# Patient Record
Sex: FEMALE | Race: WHITE | HISPANIC OR LATINO | Employment: UNEMPLOYED | URBAN - METROPOLITAN AREA
[De-identification: names, ages, dates, MRNs, and addresses within clinical notes are randomized per-mention and may not be internally consistent; named-entity substitution may affect disease eponyms.]

---

## 2017-06-20 ENCOUNTER — HOSPITAL ENCOUNTER (EMERGENCY)
Facility: MEDICAL CENTER | Age: 37
End: 2017-06-23
Attending: EMERGENCY MEDICINE
Payer: COMMERCIAL

## 2017-06-20 DIAGNOSIS — F29 PSYCHOSIS, UNSPECIFIED PSYCHOSIS TYPE (HCC): ICD-10-CM

## 2017-06-20 LAB
ALBUMIN SERPL BCP-MCNC: 4.5 G/DL (ref 3.2–4.9)
ALBUMIN/GLOB SERPL: 1.5 G/DL
ALP SERPL-CCNC: 44 U/L (ref 30–99)
ALT SERPL-CCNC: 22 U/L (ref 2–50)
AMPHET UR QL SCN: NEGATIVE
ANION GAP SERPL CALC-SCNC: 8 MMOL/L (ref 0–11.9)
AST SERPL-CCNC: 28 U/L (ref 12–45)
BARBITURATES UR QL SCN: NEGATIVE
BASOPHILS # BLD AUTO: 0.6 % (ref 0–1.8)
BASOPHILS # BLD: 0.04 K/UL (ref 0–0.12)
BENZODIAZ UR QL SCN: NEGATIVE
BILIRUB SERPL-MCNC: 0.5 MG/DL (ref 0.1–1.5)
BUN SERPL-MCNC: 7 MG/DL (ref 8–22)
BZE UR QL SCN: NEGATIVE
CALCIUM SERPL-MCNC: 9.3 MG/DL (ref 8.5–10.5)
CANNABINOIDS UR QL SCN: NEGATIVE
CHLORIDE SERPL-SCNC: 108 MMOL/L (ref 96–112)
CO2 SERPL-SCNC: 24 MMOL/L (ref 20–33)
CREAT SERPL-MCNC: 0.72 MG/DL (ref 0.5–1.4)
EOSINOPHIL # BLD AUTO: 0.04 K/UL (ref 0–0.51)
EOSINOPHIL NFR BLD: 0.6 % (ref 0–6.9)
ERYTHROCYTE [DISTWIDTH] IN BLOOD BY AUTOMATED COUNT: 44.1 FL (ref 35.9–50)
GFR SERPL CREATININE-BSD FRML MDRD: >60 ML/MIN/1.73 M 2
GLOBULIN SER CALC-MCNC: 3.1 G/DL (ref 1.9–3.5)
GLUCOSE SERPL-MCNC: 122 MG/DL (ref 65–99)
HCG SERPL QL: NEGATIVE
HCG UR QL: NEGATIVE
HCT VFR BLD AUTO: 37.7 % (ref 37–47)
HGB BLD-MCNC: 12.4 G/DL (ref 12–16)
IMM GRANULOCYTES # BLD AUTO: 0.03 K/UL (ref 0–0.11)
IMM GRANULOCYTES NFR BLD AUTO: 0.5 % (ref 0–0.9)
LYMPHOCYTES # BLD AUTO: 2.4 K/UL (ref 1–4.8)
LYMPHOCYTES NFR BLD: 38.4 % (ref 22–41)
MCH RBC QN AUTO: 29 PG (ref 27–33)
MCHC RBC AUTO-ENTMCNC: 32.9 G/DL (ref 33.6–35)
MCV RBC AUTO: 88.3 FL (ref 81.4–97.8)
MDMA UR QL SCN: NEGATIVE
METHADONE UR QL SCN: NEGATIVE
MONOCYTES # BLD AUTO: 0.48 K/UL (ref 0–0.85)
MONOCYTES NFR BLD AUTO: 7.7 % (ref 0–13.4)
NEUTROPHILS # BLD AUTO: 3.26 K/UL (ref 2–7.15)
NEUTROPHILS NFR BLD: 52.2 % (ref 44–72)
NRBC # BLD AUTO: 0 K/UL
NRBC BLD AUTO-RTO: 0 /100 WBC
OPIATES UR QL SCN: NEGATIVE
OXYCODONE UR QL SCN: NEGATIVE
PCP UR QL SCN: NEGATIVE
PLATELET # BLD AUTO: 246 K/UL (ref 164–446)
PMV BLD AUTO: 11.4 FL (ref 9–12.9)
POC BREATHALIZER: 0.01 PERCENT (ref 0–0.01)
POTASSIUM SERPL-SCNC: 3.4 MMOL/L (ref 3.6–5.5)
PROPOXYPH UR QL SCN: NEGATIVE
PROT SERPL-MCNC: 7.6 G/DL (ref 6–8.2)
RBC # BLD AUTO: 4.27 M/UL (ref 4.2–5.4)
SODIUM SERPL-SCNC: 140 MMOL/L (ref 135–145)
SP GR UR REFRACTOMETRY: 1
WBC # BLD AUTO: 6.3 K/UL (ref 4.8–10.8)

## 2017-06-20 PROCEDURE — 96376 TX/PRO/DX INJ SAME DRUG ADON: CPT

## 2017-06-20 PROCEDURE — 96374 THER/PROPH/DIAG INJ IV PUSH: CPT

## 2017-06-20 PROCEDURE — 90791 PSYCH DIAGNOSTIC EVALUATION: CPT

## 2017-06-20 PROCEDURE — 81025 URINE PREGNANCY TEST: CPT

## 2017-06-20 PROCEDURE — 85025 COMPLETE CBC W/AUTO DIFF WBC: CPT

## 2017-06-20 PROCEDURE — 84703 CHORIONIC GONADOTROPIN ASSAY: CPT

## 2017-06-20 PROCEDURE — 80307 DRUG TEST PRSMV CHEM ANLYZR: CPT

## 2017-06-20 PROCEDURE — 700111 HCHG RX REV CODE 636 W/ 250 OVERRIDE (IP): Performed by: EMERGENCY MEDICINE

## 2017-06-20 PROCEDURE — 99285 EMERGENCY DEPT VISIT HI MDM: CPT

## 2017-06-20 PROCEDURE — 80053 COMPREHEN METABOLIC PANEL: CPT

## 2017-06-20 PROCEDURE — 302970 POC BREATHALIZER: Performed by: EMERGENCY MEDICINE

## 2017-06-20 PROCEDURE — 302970 POC BREATHALIZER

## 2017-06-20 RX ORDER — LORAZEPAM 2 MG/ML
1-2 INJECTION INTRAMUSCULAR
Status: DISCONTINUED | OUTPATIENT
Start: 2017-06-20 | End: 2017-06-21 | Stop reason: SDDI

## 2017-06-20 RX ADMIN — LORAZEPAM 1 MG: 2 INJECTION INTRAMUSCULAR; INTRAVENOUS at 22:13

## 2017-06-20 RX ADMIN — LORAZEPAM 2 MG: 2 INJECTION INTRAMUSCULAR; INTRAVENOUS at 20:28

## 2017-06-20 RX ADMIN — LORAZEPAM 1 MG: 2 INJECTION INTRAMUSCULAR; INTRAVENOUS at 16:31

## 2017-06-20 ASSESSMENT — PAIN SCALES - GENERAL: PAINLEVEL_OUTOF10: 0

## 2017-06-20 ASSESSMENT — LIFESTYLE VARIABLES: DO YOU DRINK ALCOHOL: NO
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
## 2017-06-20 NOTE — ED AVS SNAPSHOT
[email or web address] Access Code: [email or web address]  Expires: 7/23/2017 12:27 PM    Your email address is not on file at [facility].  Email Addresses are required for you to sign up for [email or web address], please contact [phone] to verify your personal information and to provide your email address prior to attempting to register for Refinery29.    Mohini Aguirre SepulvedaGeorge  [address] [place] [address], [place] [address]    Refinery29  A secure, online tool to manage your health information     [facility]’s Refinery29® is a secure, online tool that connects you to your personalized health information from the privacy of your home -- day or night - making it very easy for you to manage your healthcare. Once the activation process is completed, you can even access your medical information using the Refinery29 raciel, which is available for free in the Apple Raciel store or Google Play store.     To learn more about Refinery29, visit [email or web address]    There are two levels of access available (as shown below):   My Chart Features  [facility] Primary Care Doctor [facility]  Specialists [facility]  Urgent  Care Non-[facility] Primary Care Doctor   Email your healthcare team securely and privately 24/7 X X X    Manage appointments: schedule your next appointment; view details of past/upcoming appointments X      Request prescription refills. X      View recent personal medical records, including lab and immunizations X X X X   View health record, including health history, allergies, medications X X X X   Read reports about your outpatient visits, procedures, consult and ER notes X X X X   See your discharge summary, which is a recap of your hospital and/or ER visit that includes your diagnosis, lab results, and care plan X X  X     How to register for EidoSearcht:  Once your e-mail address has been verified, follow the following steps to sign up for EidoSearcht.     1. Go to  [email or web address]  2. Click on the Sign Up Now box, which takes you to the New Member Sign Up  page. You will need to provide the following information:  a. Enter your Cohuman Access Code exactly as it appears at the top of this page. (You will not need to use this code after you’ve completed the sign-up process. If you do not sign up before the expiration date, you must request a new code.)   b. Enter your date of birth.   c. Enter your home email address.   d. Click Submit, and follow the next screen’s instructions.  3. Create a Cohuman ID. This will be your Cohuman login ID and cannot be changed, so think of one that is secure and easy to remember.  4. Create a Cohuman password. You can change your password at any time.  5. Enter your Password Reset Question and Answer. This can be used at a later time if you forget your password.   6. Enter your e-mail address. This allows you to receive e-mail notifications when new information is available in Cohuman.  7. Click Sign Up. You can now view your health information.    For assistance activating your Cohuman account, call [phone]

## 2017-06-20 NOTE — ED AVS SNAPSHOT
Home Care Instructions                                                                                                                Mohini Mason   MRN: [id number]    Department:  [facility], Emergency Dept   Date of Visit:  6/20/2017            [facility], Emergency Dept    [address] [address]    Phone:  [phone]      You were seen by     1. Jose L Michele M.D.    2. Karl Benoit M.D.    3. Romario Grant D.O.    4. Renny Henley M.D.    5. Bharat Chávez M.D.    6. Severiano Brown M.D.    7. Wan Menon M.D.      Your Diagnosis Was     Psychosis, unspecified psychosis type     F29       These are the medications you received during your hospitalization from 06/20/2017 1425 to 06/23/2017 1227     Date/Time Order Dose Route Action    06/20/2017 2213 lorazepam (ATIVAN) injection 1-2 mg 1 mg Intravenous Given    06/20/2017 2028 lorazepam (ATIVAN) injection 1-2 mg 2 mg Intravenous Given    06/20/2017 1631 lorazepam (ATIVAN) injection 1-2 mg 1 mg Intravenous Given    06/21/2017 1030 ondansetron (ZOFRAN ODT) dispertab 4 mg 4 mg Oral Given    06/21/2017 1215 lorazepam (ATIVAN) tablet 1 mg 1 mg Oral Given    06/21/2017 1400 haloperidol lactate (HALDOL) injection 5 mg 5 mg Intramuscular Given    06/23/2017 0742 quetiapine (SEROQUEL) tablet 50 mg 50 mg Oral Given    06/22/2017 1430 quetiapine (SEROQUEL) tablet 50 mg 50 mg Oral Given    06/22/2017 0821 quetiapine (SEROQUEL) tablet 50 mg 50 mg Oral Given    06/21/2017 2119 quetiapine (SEROQUEL) tablet 100 mg 100 mg Oral Given    06/22/2017 1155 lorazepam (ATIVAN) tablet 2 mg 2 mg Oral Given    06/22/2017 2128 quetiapine (SEROQUEL) tablet 200 mg 200 mg Oral Given    06/22/2017 2128 lorazepam (ATIVAN) tablet 1 mg 1 mg Oral Given    06/23/2017 0740 lorazepam (ATIVAN) tablet 1 mg 1 mg Oral Given      Medication Information     Review all of your home medications and newly  ordered medications with your primary doctor and/or pharmacist as soon as possible. Follow medication instructions as directed by your doctor and/or pharmacist.     Please keep your complete medication list with you and share with your physician. Update the information when medications are discontinued, doses are changed, or new medications (including over-the-counter products) are added; and carry medication information at all times in the event of emergency situations.               Medication List      Notice     You have not been prescribed any medications.            Procedures and tests performed during your visit     Procedure/Test Number of Times Performed    BETA-HCG QUALITATIVE URINE 1    CBC WITH DIFFERENTIAL 1    COMP METABOLIC PANEL 1    ED CONSULT TO BEHAVIORAL HEALTH 1    ESTIMATED GFR 1    HCG QUAL SERUM 1    NURSING COMMUNICATION 2    POC BREATHALIZER 2    REFRACTOMETER SG 1    TSH WITH REFLEX TO FT4 1    URINE DRUG SCREEN 1        Discharge Instructions       Alucinaciones e ideas delirantes  (Hallucinations and Delusions)  Usted ha sufrido alucinaciones o ideas delirantes. Ghulam vez haya escuchado voces que nadie más puede oir. Y que parecen ser muy reales para usted. Puede benson tenido pensamientos y temores que no tienen sentido para los demás. Deepthi problema puede deberse a sari enfermedad mental denominada esquizofrenia. Puede ser causada por un problema médico, ki sari infección o por un trastorno electrolítico. Estos síntomas también se observan en las personas que consumen drogas, especialmente los que utilizan crack, cocaína y anfetaminas. Drogas ki el PCP, LSD, MDMA, peyote y psilocibin tambien pueden causar alucinaciones atemorizantes y pérdida del control.  Si shannon síntomas se deben a reina adicción a las drogas, reina estado mental mejorará cuando no tenga rastros de drogas en reina organismo. Alguna persona de reina confianza deberá acompañarlo para protegerlo y calmar shannon temores hasta que se  sienta mejor. Con frecuencia los tranquilizantes son de gran ayuda para controlar las alucinaciones, la ansiedad y las conductas destructivas. Para la recuperación también es importante que siga sari dieta adecuada y duerma lo suficiente. Si elina síntomas no se deben al consumo de drogas, o no mejoran luego de algunos días después de abandonar el consumo, necesitará un control más profundo de reina estado general o reina bee mental.  SOLICITE ATENCIÓN MÉDICA DE INMEDIATO SI:  · Elina síntomas empeoran, especialmente si considera que reina bryan está en peligro.  · Tiene pensamientos violentos o destructivos.  La recuperación es posible, scar debe seguir el tratamiento adecuado y evitar las drogas que usted sabe que le causaron el problema.  Document Released: 12/18/2006 Document Revised: 03/11/2013  ExitCare® Patient Information ©2014 [employer].            Patient Information     Patient Information    Following emergency treatment: all patient requiring follow-up care must return either to a private physician or a clinic if your condition worsens before you are able to obtain further medical attention, please return to the emergency room.     Billing Information    At [facility], we work to make the billing process streamlined for our patients.  Our Representatives are here to answer any questions you may have regarding your hospital bill.  If you have insurance coverage and have supplied your insurance information to us, we will submit a claim to your insurer on your behalf.  Should you have any questions regarding your bill, we can be reached online or by phone as follows:  Online: You are able pay your bills online or live chat with our representatives about any billing questions you may have. We are here to help Monday - Friday from 8:00am to 7:30pm and 9:00am - 12:00pm on Saturdays.  Please visit [email or web address]  for more information.   Phone:  [phone] or  [phone]    Please note that your emergency physician, surgeon, pathologist, radiologist, anesthesiologist, and other specialists are not employed by [facility] and will therefore bill separately for their services.  Please contact them directly for any questions concerning their bills at the numbers below:     Emergency Physician Services:  [phone]  [facility]:  [phone]  [facility]:  [phone]  [facility]:  [phone]    1. Your final bill may vary from the amount quoted upon discharge if all procedures are not complete at that time, or if your doctor has additional procedures of which we are not aware. You will receive an additional bill if you return to the Emergency Department at [facility] for suture removal regardless of the facility of which the sutures were placed.     2. Please arrange for settlement of this account at the emergency registration.    3. All self-pay accounts are due in full at the time of treatment.  If you are unable to meet this obligation then payment is expected within 4-5 days.     4. If you have had radiology studies (CT, X-ray, Ultrasound, MRI), you have received a preliminary result during your emergency department visit. Please contact the radiology department [phone] to receive a copy of your final result. Please discuss the Final result with your primary physician or with the follow up physician provided.     Crisis Hotline:  [place] Crisis Hotline:  1-208-UZVJWDU or 1-218.399.1917  [place] Crisis Hotline:    1-651.216.8537 or 898-621-3228         ED Discharge Follow Up Questions    1. In order to provide you with very good care, we would like to follow up with a phone call in the next few days.  May we have your permission to contact you?     YES /  NO    2. What is the best phone number to call you? (       )_____-__________    3. What is the best time to call you?      Morning  /  Afternoon  /   Evening                   Patient Signature:  ____________________________________________________________    Date:  ____________________________________________________________
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
## 2017-06-20 NOTE — ED NOTES
Pt is a poor historian and will answer some questions directly but gets off track easily and is distractable.

## 2017-06-20 NOTE — ED AVS SNAPSHOT
6/23/2017    Mohini Aguirre Marlon  [address] [facility] [place] [address]    Dear Mohini:    [facility] wants to ensure your discharge home is safe and you or your loved ones have had all of your questions answered regarding your care after you leave the hospital.    Below is a list of resources and contact information should you have any questions regarding your hospital stay, follow-up instructions, or active medical symptoms.    Questions or Concerns Regarding… Contact   Medical Questions Related to Your Discharge  (7 days a week, 8am-5pm) Contact a Nurse Care Coordinator   [phone]   Medical Questions Not Related to Your Discharge  (24 hours a day / 7 days a week)  Contact the Nurse Health Line   [phone]    Medications or Discharge Instructions Refer to your discharge packet   or contact your [facility] Primary Care Provider   [phone]   Follow-up Appointment(s) Schedule your appointment via Prepmatic   or contact Scheduling [phone]   Billing Review your statement via Prepmatic  or contact Billing [phone]   Medical Records Review your records via Prepmatic   or contact Medical Records [phone]     You may receive a telephone call within two days of discharge. This call is to make certain you understand your discharge instructions and have the opportunity to have any questions answered. You can also easily access your medical information, test results and upcoming appointments via the Prepmatic free online health management tool. You can learn more and sign up at "[employer]"/Prepmatic. For assistance setting up your Prepmatic account, please call [phone].    Once again, we want to ensure your discharge home is safe and that you have a clear understanding of any next steps in your care. If you have any questions or concerns, please do not hesitate to contact us, we are here for you. Thank you for choosing [facility] for your healthcare needs.    Sincerely,    Your [facility] Healthcare Team

## 2017-06-21 PROCEDURE — A9270 NON-COVERED ITEM OR SERVICE: HCPCS | Performed by: PSYCHIATRY & NEUROLOGY

## 2017-06-21 PROCEDURE — 700111 HCHG RX REV CODE 636 W/ 250 OVERRIDE (IP)

## 2017-06-21 PROCEDURE — 700102 HCHG RX REV CODE 250 W/ 637 OVERRIDE(OP)

## 2017-06-21 PROCEDURE — A9270 NON-COVERED ITEM OR SERVICE: HCPCS

## 2017-06-21 PROCEDURE — 700102 HCHG RX REV CODE 250 W/ 637 OVERRIDE(OP): Performed by: PSYCHIATRY & NEUROLOGY

## 2017-06-21 PROCEDURE — 96372 THER/PROPH/DIAG INJ SC/IM: CPT

## 2017-06-21 PROCEDURE — 700111 HCHG RX REV CODE 636 W/ 250 OVERRIDE (IP): Performed by: EMERGENCY MEDICINE

## 2017-06-21 RX ORDER — QUETIAPINE FUMARATE 100 MG/1
100 TABLET, FILM COATED ORAL EVERY EVENING
Status: DISCONTINUED | OUTPATIENT
Start: 2017-06-21 | End: 2017-06-22

## 2017-06-21 RX ORDER — HALOPERIDOL 5 MG/ML
5 INJECTION INTRAMUSCULAR ONCE
Status: COMPLETED | OUTPATIENT
Start: 2017-06-21 | End: 2017-06-21

## 2017-06-21 RX ORDER — LORAZEPAM 1 MG/1
1 TABLET ORAL ONCE
Status: COMPLETED | OUTPATIENT
Start: 2017-06-21 | End: 2017-06-21

## 2017-06-21 RX ORDER — ONDANSETRON 4 MG/1
4 TABLET, ORALLY DISINTEGRATING ORAL ONCE
Status: COMPLETED | OUTPATIENT
Start: 2017-06-21 | End: 2017-06-21

## 2017-06-21 RX ORDER — QUETIAPINE FUMARATE 25 MG/1
50 TABLET, FILM COATED ORAL 2 TIMES DAILY
Status: DISCONTINUED | OUTPATIENT
Start: 2017-06-21 | End: 2017-06-23 | Stop reason: HOSPADM

## 2017-06-21 RX ADMIN — LORAZEPAM 1 MG: 1 TABLET ORAL at 12:15

## 2017-06-21 RX ADMIN — HALOPERIDOL LACTATE 5 MG: 5 INJECTION, SOLUTION INTRAMUSCULAR at 14:00

## 2017-06-21 RX ADMIN — ONDANSETRON 4 MG: 4 TABLET, ORALLY DISINTEGRATING ORAL at 10:30

## 2017-06-21 RX ADMIN — QUETIAPINE FUMARATE 100 MG: 100 TABLET ORAL at 21:19

## 2017-06-21 ASSESSMENT — PAIN SCALES - GENERAL: PAINLEVEL_OUTOF10: 0

## 2017-06-21 NOTE — ED NOTES
Pt resting comfortably on gurney. Pt with no changes from previous assessments. Respirations are even and unlabored. Bed in lowest position, q15min monitoring. Pt in direct view. Pt with no further needs at this time.

## 2017-06-21 NOTE — ED PROVIDER NOTES
"ED Provider Note    CHIEF COMPLAINT  Chief Complaint   Patient presents with   • Legal 2000     Pt arrives via EMS on a legal hold for psychosis and inability to care for self. Per report of pt's son, she has been not sleeping or eating, has been drinking an excessive amount of water, is hyperactive and has discorganized thinking. Pt talking about her babies.        HPI  Mohini Mason is a 37 y.o. female with a history of depression, PTSD who presents by EMS for psychiatric evaluation. Per the patient's son, the patient has not been eating or sleeping, but has been drinking excessive amounts of water. She appeared to be hyperactive with abnormal behavior. The patient was transported to the ER for further evaluation. The patient denies any history of drug or alcohol use. She is acting inappropriately, trying to take her clothes off, stating she needs to go to the \"[place]\".  The patient denies any falls or injury. She denies any headache, neck pain, chest pain, back pain, or abdominal pain.    REVIEW OF SYSTEMS  See HPI for further details. All other systems are negative.     PAST MEDICAL HISTORY  Past Medical History   Diagnosis Date   • Psychiatric disorder      depression       FAMILY HISTORY  No family history on file.    SOCIAL HISTORY  Social History     Social History   • Marital Status: Single     Spouse Name: N/A   • Number of Children: N/A   • Years of Education: N/A     Social History Main Topics   • Smoking status: Never Smoker    • Smokeless tobacco: None   • Alcohol Use: No   • Drug Use: No   • Sexual Activity: Not Asked     Other Topics Concern   • None     Social History Narrative       SURGICAL HISTORY  History reviewed. No pertinent past surgical history.    CURRENT MEDICATIONS  Home Medications     Reviewed by Demario Basurto (Pharmacy Tech) on 06/20/17 at 2015  Med List Status: Complete    Medication Last Dose Status          Patient Vladimir Taking any Medications                  " "      ALLERGIES  No Known Allergies    PHYSICAL EXAM  VITAL SIGNS: /87 mmHg  Pulse 83  Temp(Src) 36.1 °C (96.9 °F)  Resp 16  Ht 1.549 m (5' 1\")  Wt 52.164 kg (115 lb)  BMI 21.74 kg/m2  Constitutional: Awake, alert, in no acute distress, Non-toxic appearance.   HENT: Atraumatic. Bilateral external ears normal, mucous membranes moist, throat nonerythematous without exudates, nose is normal.  Eyes: PERRL, EOMI, conjunctiva moist, noninjected.  Neck: Nontender, Normal range of motion, No nuchal rigidity, No stridor.   Lymphatic: No lymphadenopathy noted.   Cardiovascular: Regular rate and rhythm, no murmurs, rubs, gallops.  Thorax & Lungs:  Good breath sounds bilaterally, no wheezes, rales, or retractions.  No chest tenderness.  Abdomen: Bowel sounds normal, Soft, nontender, nondistended, no rebound, guarding, masses.  Back: No CVA or spinal tenderness.  Extremities: Intact distal pulses, No edema, No tenderness.   Skin: Warm, Dry, No rashes.   Musculoskeletal: No joint swelling or tenderness.  Neurologic: Alert & oriented x 2, cannot give me the year or date, and radial nerves II through XII appear intact, no facial asymmetry, sensory intact to light touch, and motor function shows 5/5 strength to the upper and lower extremities though patient cannot comply with a full neurologic exam..    Psychiatric: The patient appears mildly psychotic, unable to care for herself. She appears to have pressured speech, appears disorganized, unable to carry on a coherent conversation.        RADIOLOGY/PROCEDURES  No orders to display         COURSE & MEDICAL DECISION MAKING  Pertinent Labs & Imaging studies reviewed. (See chart for details)  The patient presents for a psychiatric evaluation. She appears to be mildly psychotic. Initially was somewhat agitated and combative on arrival, and was placed in 4 point restraints. IV is placed, she was given Ativan 1 mg IV.  CBC white count of 6300, normal differential, hemoglobin " 12.4, chemistry potassium 3.4, glucose 122, otherwise normal, urine drug screen negative, breath alcohol of 0.01, hCG negative.  A [facility] consult was obtained, and patient was felt unable to care for herself. Patient was placed on a legal 2000, and will be transferred to mental health and accepted.    FINAL IMPRESSION  1. Acute psychosis  2.   3.         Electronically signed by: Jose L Michele, 6/20/2017 8:38 PM

## 2017-06-21 NOTE — ED NOTES
Pt resting comfortably on gurney. Pt with no changes from previous assessments. Respirations are even and unlabored. Bed in lowest position, q15 monitoring in place. Pt in direct view. Pt with no further needs at this time.

## 2017-06-21 NOTE — ED NOTES
Pt resting comfortably on gurney. Pt with no changes from previous assessments. Respirations are even and unlabored. Bed in lowest position, sitter at bedside. Pt in direct view. Pt with no further needs at this time.

## 2017-06-21 NOTE — PROGRESS NOTES
Patient's home medications have been reviewed by the pharmacy team.     Patient's Medications   New Prescriptions    No medications on file   Previous Medications    No medications on file   Modified Medications    No medications on file   Discontinued Medications    ALPRAZOLAM (XANAX) 0.5 MG TAB    Take 1 Tab by mouth 2 times a day as needed for Sleep or Anxiety.         A:  Medications do not appear to be contributing to current complaints. Denies taking medications.      P:    No recommendations at this time. Await psych recommendations.      Dawood Meraz, PharmD, BCPS

## 2017-06-21 NOTE — ED NOTES
Pt resting comfortably on gurney. Pt with no changes from previous assessments. Respirations are even and unlabored. Bed in lowest position, q15 in place. Pt in direct view. Pt with no further needs at this time.

## 2017-06-21 NOTE — ED NOTES
Pt refused to let me give her a shot. Called security for backup. Pt peed on the floor and started eating paper and singing.  Pt used crayons to color all over the walls and floors.  Security assisted to give IM haldol. Pt begin to sing about her son in Northern [place].

## 2017-06-21 NOTE — ED NOTES
Pt resting comfortably on gurney. Pt with no changes from previous assessments. Respirations are even and unlabored. Bed in lowest position. Pt in direct view. Pt with no further needs at this time.

## 2017-06-21 NOTE — ED NOTES
Pt cutting hands with top of urine cup. Removed all things from room including crayons and trash can. Pt spit out what she was eating of her lunch and threw the rest in the trash as i was pulling the trash can out of the room.

## 2017-06-21 NOTE — ED NOTES
Respirations are even and unlabored. Bed in lowest position, q15 monitoring in place. Pt in direct view. Pt with no further needs at this time.

## 2017-06-21 NOTE — ED NOTES
Security at bedside to remove hard restraints. Pt resting comfortably and calmly in bed. Pt demonstrates understanding of appropriate behavior at this time. Pt agrees to sleep at this time.

## 2017-06-21 NOTE — DISCHARGE PLANNING
Medical Social Work    Referral: Legal Hold    Intervention: Legal Hold Paperwork given to SW by [employer] RN: Imani    Legal Hold Initiated: Date: 06/20/2017  Time: 1340    Legal Hold faxed: Date: 06/20/2017  Time: 2140    Patient’s Insurance Listed on Face Sheet: None    Referrals sent to: [facility]    Plan: Patient will transfer to mental health facility once acceptance is obtained.

## 2017-06-21 NOTE — ED NOTES
Med rec updated and complete.  Allergies reviewed.  Pt was willing to  Talk to this writer.  Pt denied that she takes medications.  Pt stated that she thinks that she should take medications.

## 2017-06-21 NOTE — ED NOTES
"Pt acting with bizarre behavior outside of room. Pt standing in hallway refusing to go back to bed. Pt fidgeting. Pt rambling, \"Bathroom, bathroom, bathroom.\" Pt pointing towards door. Pt will not cooperate with RN. Offered assistance returning to room but pt refusing. Pt throwing arms in air and swinging toward RN. Pt appears to have removed IV from arm. Security called.  Pt escorted back to room. New PIV established with security at bedside. Pt medicated per MAR with 2mg Ativan. Behavioral restraints applied. ERP notified.   "

## 2017-06-21 NOTE — ED NOTES
Pt ran to bathroom to vomit. Pt found spitting in the toilet. No vomit present in toilet or trash can. Gave ODT zofran

## 2017-06-21 NOTE — ED NOTES
"Pt's son on phone requesting updates and information about pt. Pt unable to given consent to speak with son at this time. Pt confusing with bizarre thinking at the time. Pt not answering my quesionts only repeating, \"She, she, she.\" Pt re-oriented and redirected. Assurance provided. Advised son that he can try to call back to tomorrow. Son voiced understanding.   "

## 2017-06-21 NOTE — ED NOTES
Pt remains calm and drowsy. Has not been removing her clothing or making moves to harm self. Security at bedside for complete  restraint removal.

## 2017-06-22 LAB
POC BREATHALIZER: 0 PERCENT (ref 0–0.01)
TSH SERPL DL<=0.005 MIU/L-ACNC: 0.87 UIU/ML (ref 0.3–3.7)

## 2017-06-22 PROCEDURE — A9270 NON-COVERED ITEM OR SERVICE: HCPCS | Performed by: PSYCHIATRY & NEUROLOGY

## 2017-06-22 PROCEDURE — 700102 HCHG RX REV CODE 250 W/ 637 OVERRIDE(OP): Performed by: PSYCHIATRY & NEUROLOGY

## 2017-06-22 PROCEDURE — 84443 ASSAY THYROID STIM HORMONE: CPT

## 2017-06-22 PROCEDURE — A9270 NON-COVERED ITEM OR SERVICE: HCPCS | Performed by: EMERGENCY MEDICINE

## 2017-06-22 PROCEDURE — 700102 HCHG RX REV CODE 250 W/ 637 OVERRIDE(OP): Performed by: EMERGENCY MEDICINE

## 2017-06-22 RX ORDER — LORAZEPAM 1 MG/1
1 TABLET ORAL
Status: COMPLETED | OUTPATIENT
Start: 2017-06-22 | End: 2017-06-23

## 2017-06-22 RX ORDER — LORAZEPAM 1 MG/1
1 TABLET ORAL NIGHTLY
Status: DISCONTINUED | OUTPATIENT
Start: 2017-06-22 | End: 2017-06-22

## 2017-06-22 RX ORDER — LORAZEPAM 1 MG/1
2 TABLET ORAL ONCE
Status: COMPLETED | OUTPATIENT
Start: 2017-06-22 | End: 2017-06-22

## 2017-06-22 RX ORDER — LORAZEPAM 1 MG/1
1 TABLET ORAL
Status: DISCONTINUED | OUTPATIENT
Start: 2017-06-22 | End: 2017-06-23 | Stop reason: HOSPADM

## 2017-06-22 RX ORDER — QUETIAPINE FUMARATE 100 MG/1
200 TABLET, FILM COATED ORAL EVERY EVENING
Status: DISCONTINUED | OUTPATIENT
Start: 2017-06-22 | End: 2017-06-23 | Stop reason: HOSPADM

## 2017-06-22 RX ADMIN — LORAZEPAM 2 MG: 1 TABLET ORAL at 11:55

## 2017-06-22 RX ADMIN — QUETIAPINE 50 MG: 25 TABLET, FILM COATED ORAL at 14:30

## 2017-06-22 RX ADMIN — QUETIAPINE FUMARATE 200 MG: 100 TABLET ORAL at 21:28

## 2017-06-22 RX ADMIN — QUETIAPINE 50 MG: 25 TABLET, FILM COATED ORAL at 08:21

## 2017-06-22 RX ADMIN — LORAZEPAM 1 MG: 1 TABLET ORAL at 21:28

## 2017-06-22 ASSESSMENT — PAIN SCALES - GENERAL
PAINLEVEL_OUTOF10: 0

## 2017-06-22 NOTE — PSYCHIATRY
PSYCHIATRIC CONSULTATION:seen. Full note to follow. Legal hold extended. meds started. Appears to be in a manic state.

## 2017-06-22 NOTE — ED NOTES
Pt easily awakens for VS check. No distress noted at this time.   Sitter outside room, unobstructed view of pt.

## 2017-06-22 NOTE — ED NOTES
Given crayons and coloring book.  Explained that drawing on the floor/walls is unacceptable and pt verbalizes understanding.  Sitter outside room.

## 2017-06-22 NOTE — ED NOTES
Bedside report received, Assumed care. Pt on gurney resting with eyes closed;  Rise and fall of chest seen.  Sitter outside room.

## 2017-06-22 NOTE — PSYCHIATRY
"PSYCHIATRIC FOLLOW UP:    Reason for Admission: Pt arrives via EMS on a legal hold for psychosis and inability to care for self. Per report of pt's son, she has been not sleeping or eating, has been drinking an excessive amount of water, is hyperactive and has discorganized thinking. Pt talking about her babies. Per the patient's son, the patient has not been eating or sleeping, but has been drinking excessive amounts of water. She appeared to be hyperactive with abnormal behavior, acting inappropriately, trying to take her clothes off.  Legal hold status:  +    She is writing her families names all over the floor and the walls, she is still \"cloudy\" and has VH or \"visions\" which she doesn't/can't describe. She only slept a \"little bit\". She is maybe \"a little better\" but can't describe how. She has an odd way of interacting tending to repeat statements like \"Mynor can save me\". Speaks a lot of family. However it is superficial talk for the most part. Has also drawn a \"face and 2 eyes\" on the light switch. Says she is afraid to sleep with lights off but doesn't know why. Wants a shower.    Psychiatric Examination: observed phenomenon:  Vitals:Blood pressure 121/78, pulse 78, temperature 36.6 °C (97.9 °F), resp. rate 18, height 1.549 m (5' 1\"), weight 52.164 kg (115 lb), SpO2 97 %.  Musculoskeletal(abnormal movements, gait, etc):none noted  Appearance: odorous but good eye contact. Tends to stand very close to the degree of being mildly intrusive into one's space.  Thoughts: linear and perseverant of subject matter regarding family. Psychotic.  Speech:mildly pressured.  Mood: anixous    Affect: appropriate  SI/HI: says no to SI because of her babies. Later says she is suicidal and wants to be dead. Not HI.  Attention/Alertness: mildly distracted.     Memory: intact  Orientation: x 4  Fund of Knowledge: not assessed.     Insight/Judgement into symptoms: fair to good in that she knows her head is not right   "   Assessment:(acuity level)  Bipolar Disorder I, mixed state with psychosis. Has elements of nelly: not sleeping, pacing, racing thoughts and is depressed.          Plan:may shower and have visitors and phone. Gave her my pen. She can have it to draw more on the wall with. The damage to the wall is already done with the crayons..increase seroquel for sleep with backup of ativan if more is needed.  legal hold:extended  Anticipated F/U: within 24 hours.  Labs/tests ordered:tsh     Will follow.

## 2017-06-22 NOTE — ED NOTES
Pt lying right side, eyes closed. Pt awakens to light touch.   Sitter outside of room, unobstructed view of pt.

## 2017-06-22 NOTE — ED NOTES
Pt lying supine, eyes closed. Pt awakens to light touch.   Sitter outside of room, unobstructed view of pt.

## 2017-06-22 NOTE — ED NOTES
Pt lying supine, eyes closed. Breathing equal and unlabored. No distress noted.  Sitter outside room, unobstructed view of pt.

## 2017-06-22 NOTE — ED NOTES
Pt medicated per MAR. VSS. Pt calm and cooperative.   Sitter outside of room, unobstructed view of pt.

## 2017-06-22 NOTE — ED NOTES
Pt agitated, attempting to eat paper toewls, pacing and has made several trips to the bathroom for more paper towels.  ERP aware.  Orders received.

## 2017-06-22 NOTE — ED NOTES
Pt status remains unchanged. Pt offered box meal, pt declined.  Sitter outside of room, unobstructed view of pt.

## 2017-06-23 VITALS
SYSTOLIC BLOOD PRESSURE: 120 MMHG | TEMPERATURE: 98 F | WEIGHT: 115 LBS | HEART RATE: 73 BPM | OXYGEN SATURATION: 100 % | RESPIRATION RATE: 16 BRPM | DIASTOLIC BLOOD PRESSURE: 84 MMHG | HEIGHT: 61 IN | BODY MASS INDEX: 21.71 KG/M2

## 2017-06-23 PROCEDURE — A9270 NON-COVERED ITEM OR SERVICE: HCPCS | Performed by: EMERGENCY MEDICINE

## 2017-06-23 PROCEDURE — 700102 HCHG RX REV CODE 250 W/ 637 OVERRIDE(OP): Performed by: PSYCHIATRY & NEUROLOGY

## 2017-06-23 PROCEDURE — 700102 HCHG RX REV CODE 250 W/ 637 OVERRIDE(OP): Performed by: EMERGENCY MEDICINE

## 2017-06-23 PROCEDURE — A9270 NON-COVERED ITEM OR SERVICE: HCPCS | Performed by: PSYCHIATRY & NEUROLOGY

## 2017-06-23 RX ORDER — DIVALPROEX SODIUM 125 MG/1
500 CAPSULE, COATED PELLETS ORAL EVERY 12 HOURS
Status: DISCONTINUED | OUTPATIENT
Start: 2017-06-23 | End: 2017-06-23 | Stop reason: HOSPADM

## 2017-06-23 RX ADMIN — QUETIAPINE 50 MG: 25 TABLET, FILM COATED ORAL at 07:42

## 2017-06-23 RX ADMIN — LORAZEPAM 1 MG: 1 TABLET ORAL at 07:40

## 2017-06-23 NOTE — DISCHARGE PLANNING
KRISTAN received an order from Dr. Farr to extend pts legal hold. KRISTAN faxed extension information to Tri Arguello's office and received a fax confirmation. Pt is awaiting acceptance to a mental health facility.   .

## 2017-06-23 NOTE — DISCHARGE INSTRUCTIONS
Alucinaciones e ideas delirantes  (Hallucinations and Delusions)  Usted ha sufrido alucinaciones o ideas delirantes. Ghulam vez haya escuchado voces que nadie más puede oir. Y que parecen ser muy reales para usted. Puede benson tenido pensamientos y temores que no tienen sentido para los demás. Deepthi problema puede deberse a sari enfermedad mental denominada esquizofrenia. Puede ser causada por un problema médico, ki sari infección o por un trastorno electrolítico. Estos síntomas también se observan en las personas que consumen drogas, especialmente los que utilizan crack, cocaína y anfetaminas. Drogas ki el PCP, LSD, MDMA, peyote y psilocibin tambien pueden causar alucinaciones atemorizantes y pérdida del control.  Si elina síntomas se deben a reina adicción a las drogas, reina estado mental mejorará cuando no tenga rastros de drogas en reina organismo. Alguna persona de reina confianza deberá acompañarlo para protegerlo y calmar elina temores hasta que se sienta mejor. Con frecuencia los tranquilizantes son de gran ayuda para controlar las alucinaciones, la ansiedad y las conductas destructivas. Para la recuperación también es importante que siga sari dieta adecuada y duerma lo suficiente. Si elina síntomas no se deben al consumo de drogas, o no mejoran luego de algunos días después de abandonar el consumo, necesitará un control más profundo de reina estado general o reina bee mental.  SOLICITE ATENCIÓN MÉDICA DE INMEDIATO SI:  · Elina síntomas empeoran, especialmente si considera que reina bryan está en peligro.  · Tiene pensamientos violentos o destructivos.  La recuperación es posible, scar debe seguir el tratamiento adecuado y evitar las drogas que usted sabe que le causaron el problema.  Document Released: 12/18/2006 Document Revised: 03/11/2013  [employer]® Patient Information ©2014 [employer].

## 2017-06-23 NOTE — ED NOTES
Legal hold d/c'd per psych.  Patient discharged to son.  Discharge instructions, follow up information, and prescription x 1 given to son.  1 blue bag of personal belongings returned to patient.  Ambulatory out of ED w/son.
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
## 2017-06-23 NOTE — PSYCHIATRY
"PSYCHIATRIC FOLLOW UP:    Reason for Admission: Per family member, increased H2O intake (Na wnl), not eating, not sleeping, hyperactive, disorganized thinking  Legal hold status:+, now dc'd.        Much clearer. Says she realizes she shouldn't have been drawing on the floor and has been cleaning it up. Her son is also here and he says she is dramatically better. She has been sleeping. Both would like to go home. Speech is more normal, she is not writing and repeating. Not physically intrusive.     Psychiatric Examination: observed phenomenon:  Vitals:Blood pressure 120/84, pulse 73, temperature 36.7 °C (98 °F), resp. rate 16, height 1.549 m (5' 0.98\"), weight 52.164 kg (115 lb), SpO2 100 %.  Musculoskeletal(abnormal movements, gait, etc):none noted.  Appearance:clean, good eye contact, in pjs, normal habitus  Thoughts: linear, no overt psychosis.  Speech: Slovak but knows english.  Mood:good  Affect: appropriate  SI/HI: not SI. Not HI  Attention/Alertness: able to focus  Memory:  intact     Orientation: x 4  Fund of Knowledge: not tested     Insight/Judgement into symptoms: good      Assessment:(acuity level)  Bipolar Disorder I, mixed state with psychosis. Stabilized.       Plan:discussed seroquel with both of them, expectations, risks, benefits including that this is not an antidepressant so if her mood worsens, other medications might be indicated. [facility] given for 30 days , NR.  legal hold:dc'd     Discussed with other provider:  Signing off            "

## 2017-06-23 NOTE — DISCHARGE PLANNING
Alert team note:  Patient is much calmer.  She received a dose of Ativan.  Was able to answer simple questions.  She reports she is sleeping and eating well.  She was pleasant and not trying to elope.  Continue legal hold.

## 2019-08-23 ENCOUNTER — HOSPITAL ENCOUNTER (OUTPATIENT)
Dept: RADIOLOGY | Facility: MEDICAL CENTER | Age: 39
End: 2019-08-23
Attending: OBSTETRICS & GYNECOLOGY
Payer: COMMERCIAL

## 2019-08-23 DIAGNOSIS — Z12.31 SCREENING MAMMOGRAM, ENCOUNTER FOR: ICD-10-CM

## 2019-08-23 PROCEDURE — 77063 BREAST TOMOSYNTHESIS BI: CPT

## 2019-10-05 NOTE — ED NOTES
Phoebe informed RN of pt trying to ambulate out of room. Sitter states pt was pulling on door and sitter went to pt's room, at that time pt turned around back towards bed. Sitter states pt quickly sat down on floor. ED tech at bedside to assist pt. Pt unable to ambulate safely at this time. Pt offered bed pan and did not void at this time. Troyter informed to notify RN if pt wakes up or tries to get out of bed.    Dr. Waldrop

## 2020-05-15 ENCOUNTER — HOSPITAL ENCOUNTER (EMERGENCY)
Facility: MEDICAL CENTER | Age: 40
End: 2020-05-16
Attending: EMERGENCY MEDICINE
Payer: COMMERCIAL

## 2020-05-15 DIAGNOSIS — R45.850 HOMICIDAL IDEATION: ICD-10-CM

## 2020-05-15 LAB
AMPHET UR QL SCN: NEGATIVE
BARBITURATES UR QL SCN: NEGATIVE
BENZODIAZ UR QL SCN: NEGATIVE
BZE UR QL SCN: NEGATIVE
CANNABINOIDS UR QL SCN: NEGATIVE
METHADONE UR QL SCN: NEGATIVE
OPIATES UR QL SCN: NEGATIVE
OXYCODONE UR QL SCN: NEGATIVE
PCP UR QL SCN: NEGATIVE
POC BREATHALIZER: 0 PERCENT (ref 0–0.01)
PROPOXYPH UR QL SCN: NEGATIVE

## 2020-05-15 PROCEDURE — 90791 PSYCH DIAGNOSTIC EVALUATION: CPT

## 2020-05-15 PROCEDURE — 302970 POC BREATHALIZER: Performed by: EMERGENCY MEDICINE

## 2020-05-15 PROCEDURE — 99285 EMERGENCY DEPT VISIT HI MDM: CPT

## 2020-05-15 PROCEDURE — 80307 DRUG TEST PRSMV CHEM ANLYZR: CPT

## 2020-05-15 RX ORDER — LAMOTRIGINE 25 MG/1
25 TABLET ORAL DAILY
COMMUNITY

## 2020-05-15 RX ORDER — OLANZAPINE 5 MG/1
10 TABLET ORAL 2 TIMES DAILY
COMMUNITY

## 2020-05-15 RX ORDER — HYDROXYZINE PAMOATE 25 MG/1
25 CAPSULE ORAL PRN
COMMUNITY

## 2020-05-15 RX ORDER — LORAZEPAM 1 MG/1
1 TABLET ORAL DAILY
Status: ON HOLD | COMMUNITY
End: 2023-08-04

## 2020-05-16 VITALS
RESPIRATION RATE: 14 BRPM | HEART RATE: 84 BPM | DIASTOLIC BLOOD PRESSURE: 72 MMHG | HEIGHT: 65 IN | OXYGEN SATURATION: 98 % | BODY MASS INDEX: 28.36 KG/M2 | TEMPERATURE: 98 F | WEIGHT: 170.19 LBS | SYSTOLIC BLOOD PRESSURE: 114 MMHG

## 2020-05-16 PROCEDURE — A9270 NON-COVERED ITEM OR SERVICE: HCPCS | Performed by: EMERGENCY MEDICINE

## 2020-05-16 PROCEDURE — 700102 HCHG RX REV CODE 250 W/ 637 OVERRIDE(OP): Performed by: EMERGENCY MEDICINE

## 2020-05-16 RX ORDER — LORAZEPAM 1 MG/1
1 TABLET ORAL ONCE
Status: COMPLETED | OUTPATIENT
Start: 2020-05-16 | End: 2020-05-16

## 2020-05-16 RX ADMIN — LORAZEPAM 1 MG: 1 TABLET ORAL at 04:30

## 2020-05-16 NOTE — ED NOTES
Pt placed on legal hold by behavioral health. Sitter at pt's bedside. All belongings removed from patient.
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
## 2020-05-16 NOTE — ED NOTES
Pt states that she has been taking antidepressants but still is feeling depressed. Pt states that she is having issues between her  and children that has been making her depression worse. Pt denies and suicidal/homicidal thoughts.
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
## 2020-05-16 NOTE — DISCHARGE PLANNING
Medical Social Work    Referral: Legal Hold    Intervention: Legal Hold Paperwork given to SW by [employer] RN Derek    Legal Hold Initiated: Date: 5/15/2020 Time: 2139    Patient’s Insurance Listed on Face Sheet: Jennifer    Referrals sent to: [facility], [place], [facility], [facility], and [facility].     This referral contains the following information:  1) Face sheet __x__  2) Page 1 and Page 2 of Legal Hold __x__  3) Alert Team Assessment/Psych Assessment __x__  4) Head to toe physical exam __x__  5) Urine Drug Screen _x___  6) Blood Alcohol __x__  7) Vital signs __x__  8) Pregnancy test when applicable _na__  9) Medications list __x__    Plan: Patient will transfer to mental health facility once acceptance is obtained

## 2020-05-16 NOTE — DISCHARGE PLANNING
PCS completed and faxed to [facility].  Transportation time arranged for 1200    Transfer Packet completed with Original Legal Hold placed inside. RN updated on transfer and transfer time. RN aware of Locker Number 11 Items . No pharmacy or safekeeping items.     [facility] aware of 1200 transfer time by [facility].

## 2020-05-16 NOTE — DISCHARGE PLANNING
[facility] from [place] called and they will accept Pt.  Dr. Dunlap will be admitting physician.    SW will arrange transportation and update RN.

## 2020-05-16 NOTE — ED NOTES
Pt's son Leonel Vj [phone]  Pt's son Serjio Reyesrez [phone]  Pt's daughter Daksha Vj [phone]
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
## 2022-03-24 NOTE — ED NOTES
Pt lying right side, eyes closed. Pt easily awakens to verbal commands, calm and cooperative. No distress noted.  Sitter outside of room, unobstructed view of pt.    Alert and oriented to person, place and time

## 2023-04-23 ENCOUNTER — HOSPITAL ENCOUNTER (EMERGENCY)
Facility: MEDICAL CENTER | Age: 43
End: 2023-04-23
Attending: OBSTETRICS & GYNECOLOGY | Admitting: OBSTETRICS & GYNECOLOGY
Payer: COMMERCIAL

## 2023-04-23 VITALS
OXYGEN SATURATION: 99 % | SYSTOLIC BLOOD PRESSURE: 137 MMHG | RESPIRATION RATE: 18 BRPM | BODY MASS INDEX: 26.66 KG/M2 | WEIGHT: 160 LBS | TEMPERATURE: 97.1 F | HEIGHT: 65 IN | DIASTOLIC BLOOD PRESSURE: 72 MMHG | HEART RATE: 75 BPM

## 2023-04-23 LAB
ALBUMIN SERPL BCP-MCNC: 3.4 G/DL (ref 3.2–4.9)
ALBUMIN/GLOB SERPL: 1.1 G/DL
ALP SERPL-CCNC: 69 U/L (ref 30–99)
ALT SERPL-CCNC: 23 U/L (ref 2–50)
ANION GAP SERPL CALC-SCNC: 15 MMOL/L (ref 7–16)
APPEARANCE UR: CLEAR
AST SERPL-CCNC: 20 U/L (ref 12–45)
BASOPHILS # BLD AUTO: 0.5 % (ref 0–1.8)
BASOPHILS # BLD: 0.04 K/UL (ref 0–0.12)
BILIRUB SERPL-MCNC: <0.2 MG/DL (ref 0.1–1.5)
BUN SERPL-MCNC: 8 MG/DL (ref 8–22)
CALCIUM ALBUM COR SERPL-MCNC: 9.2 MG/DL (ref 8.5–10.5)
CALCIUM SERPL-MCNC: 8.7 MG/DL (ref 8.5–10.5)
CHLORIDE SERPL-SCNC: 105 MMOL/L (ref 96–112)
CO2 SERPL-SCNC: 17 MMOL/L (ref 20–33)
COLOR UR AUTO: YELLOW
CREAT SERPL-MCNC: 0.34 MG/DL (ref 0.5–1.4)
EOSINOPHIL # BLD AUTO: 0.35 K/UL (ref 0–0.51)
EOSINOPHIL NFR BLD: 4.4 % (ref 0–6.9)
ERYTHROCYTE [DISTWIDTH] IN BLOOD BY AUTOMATED COUNT: 46.5 FL (ref 35.9–50)
GFR SERPLBLD CREATININE-BSD FMLA CKD-EPI: 129 ML/MIN/1.73 M 2
GLOBULIN SER CALC-MCNC: 3.2 G/DL (ref 1.9–3.5)
GLUCOSE SERPL-MCNC: 103 MG/DL (ref 65–99)
GLUCOSE UR QL STRIP.AUTO: NEGATIVE MG/DL
HCT VFR BLD AUTO: 34 % (ref 37–47)
HGB BLD-MCNC: 11.4 G/DL (ref 12–16)
IMM GRANULOCYTES # BLD AUTO: 0.24 K/UL (ref 0–0.11)
IMM GRANULOCYTES NFR BLD AUTO: 3 % (ref 0–0.9)
KETONES UR QL STRIP.AUTO: NEGATIVE MG/DL
LEUKOCYTE ESTERASE UR QL STRIP.AUTO: ABNORMAL
LYMPHOCYTES # BLD AUTO: 1.22 K/UL (ref 1–4.8)
LYMPHOCYTES NFR BLD: 15.3 % (ref 22–41)
MCH RBC QN AUTO: 30.8 PG (ref 27–33)
MCHC RBC AUTO-ENTMCNC: 33.5 G/DL (ref 33.6–35)
MCV RBC AUTO: 91.9 FL (ref 81.4–97.8)
MONOCYTES # BLD AUTO: 0.91 K/UL (ref 0–0.85)
MONOCYTES NFR BLD AUTO: 11.4 % (ref 0–13.4)
NEUTROPHILS # BLD AUTO: 5.2 K/UL (ref 2–7.15)
NEUTROPHILS NFR BLD: 65.4 % (ref 44–72)
NITRITE UR QL STRIP.AUTO: NEGATIVE
NRBC # BLD AUTO: 0 K/UL
NRBC BLD-RTO: 0 /100 WBC
PH UR STRIP.AUTO: 7 [PH] (ref 5–8)
PLATELET # BLD AUTO: 221 K/UL (ref 164–446)
PMV BLD AUTO: 10.9 FL (ref 9–12.9)
POTASSIUM SERPL-SCNC: 3.6 MMOL/L (ref 3.6–5.5)
PROT SERPL-MCNC: 6.6 G/DL (ref 6–8.2)
PROT UR QL STRIP: NEGATIVE MG/DL
RBC # BLD AUTO: 3.7 M/UL (ref 4.2–5.4)
RBC UR QL AUTO: NEGATIVE
SODIUM SERPL-SCNC: 137 MMOL/L (ref 135–145)
SP GR UR STRIP.AUTO: 1.02 (ref 1–1.03)
URATE SERPL-MCNC: 3.8 MG/DL (ref 1.9–8.2)
WBC # BLD AUTO: 8 K/UL (ref 4.8–10.8)

## 2023-04-23 PROCEDURE — 99284 EMERGENCY DEPT VISIT MOD MDM: CPT

## 2023-04-23 PROCEDURE — 36415 COLL VENOUS BLD VENIPUNCTURE: CPT

## 2023-04-23 PROCEDURE — 84550 ASSAY OF BLOOD/URIC ACID: CPT

## 2023-04-23 PROCEDURE — 81002 URINALYSIS NONAUTO W/O SCOPE: CPT

## 2023-04-23 PROCEDURE — 85025 COMPLETE CBC W/AUTO DIFF WBC: CPT

## 2023-04-23 PROCEDURE — 80053 COMPREHEN METABOLIC PANEL: CPT

## 2023-04-24 NOTE — ED PROVIDER NOTES
"DATE OF SERVICE:  2023     OBSTETRIC EMERGENCY DEPARTMENT NOTE     CHIEF COMPLAINT:  Vaginal bleeding, abdominal pain.     HISTORY OF PRESENT ILLNESS:  This 43-year-old lady states that she is    7, para 4.  We do not have prenatal records.  She states that her FANTASMA is   2023, which would make her EGA 23 and 6/7th weeks.  She states that   approximately 11 hours ago, she had a very small amount of unprovoked vaginal   bleeding, no ongoing bleeding.  She states that approximately 6 hours ago   after lifting something heavy while cleaning the bathroom, she had some lower   abdominal pain.  She reports normal fetal movement.  She says that during her   prenatal care, Dr. Gavin was concerned about \"something wrong with the   placenta.\"  I contacted Dr. Gavin and she told me the only remarkable   finding was a marginal cord insertion.     OBSTETRIC HISTORY:  The patient reports 4 uncomplicated term vaginal births   and 2 miscarriages.     PAST MEDICAL HISTORY:  Positive for depression, bipolar disorder.     PAST SURGICAL HISTORY:  None.     ALLERGIES:  None.     MEDICATIONS:  1.  Prenatal vitamin daily.  2.  Sertraline 100 mg daily.  3.  Olanzapine 5 mg daily.    DIAGNOSTIC DATA:  Ultrasound and transvaginal ultrasound by me:   Normal   amniotic fluid volume, posterior fundal placenta with no extra chorionic clot.    The baby is very active.  Transvaginal ultrasound reveals a cervical length   of 43 mm.  There is no beaking.     PHYSICAL EXAMINATION:  VITAL SIGNS:  Blood pressures were taken multiple times over several hours,   her systolics ranged from 129-152, diastolics ranged from 70-85; temp is 97.1.  LUNGS:  Clear to auscultation.  ABDOMEN:  Soft and nontender.  Her fundal height is appropriate for   gestational age.  Uterus is soft and nontender.  PELVIC:  Cervix is firm, thick, closed, and posterior.  There was no blood on   my glove after the exam.     LABORATORY DATA:  Hemoglobin 11.4, " hematocrit 34.0, white blood count 8000,   platelet count 221,000.  BUN 8, creatinine 0.34.  SGOT 20, SGPT 23.  Uric acid   3.8.  Point-of-care urinalysis shows no proteinuria.        DIAGNOSES:  1.  A 23 and 6/7th weeks' gestation, ongoing.  2.  Reported history of vaginal bleeding, no evidence of abnormal placentation   or  labor.  Presently, no ongoing bleeding.  3.  Transient abdominal pain after heavy lifting, unremarkable ultrasound and   physical exam.  4.  Mild gestational hypertension.     RECOMMENDATION:  She is discharged home, undelivered, with instructions to   follow up with Dr. Kuhn.        ______________________________  MD LEILA Wilson/ANNA    DD:  2023 22:24  DT:  2023 01:05    Job#:  [id number]    CC:FAIZAN KUHN MD

## 2023-08-02 NOTE — H&P
DATE OF ADMISSION:  2023     ADMITTING DIAGNOSES:   1.  Intrauterine pregnancy at 38+ weeks.  2.  Advanced maternal age, negative NIPT, negative MSAFP.  3.  A2 diabetes, on insulin.  4.  Depression.  5.  Iron deficiency anemia.  6.  GBS negative.  7.  History of alcohol use.     HISTORY OF PRESENT ILLNESS:  This is a 43-year-old  7, para 4 Latin   American female at 38+ weeks with a due date of 2023 based on ultrasound   at 12 weeks.  This pregnancy was unplanned.  The patient was not using any   birth control, but to her surprise became pregnant.  The patient has a history   of depression and has a history of suicidal ideation, requiring   hospitalization.  She is being followed by a psychiatrist at the [facility].    When she first saw me for pregnancy confirmation in February, she was   considering termination of pregnancy, but then changed her mind. She was on   sertraline 100 mg 1 p.o. q. day, on olanzapine 10 mg 1 p.o. q. day and also   carbamazepine 200 mg 1 p.o. twice a day.  Initially, she discontinued all 3 of   her medications and when she came to see me, she had a flat affect and was   not feeling well.  I instructed the patient to restart all 3 medications.    After the patient felt better and not feeling depressed, she decided to   continue with the pregnancy.  [facility]    She had a negative NIPT and a negative MSAFP and normal ultrasounds.  She was   diagnosed with gestational diabetes based on elevated 1-hour glucose of 200   and since then, she has been started on insulin.  The patient's blood sugars   have been stable.  She has been having twice a week NSTs and once a week EDDIE   checks and her ultrasounds with [facility] have been stable.  The   patient is AMA and 38+ weeks and therefore, per [facility]   recommendations, we are proceeding with induction of labor.  I discussed with   the patient the reason for induction  of labor.  The risks, benefits,   indications and alternatives were discussed.  The patient has no unanswered   questions and wants to proceed.     PAST MEDICAL HISTORY:    1.  Depression with previous admission for suicidal ideation.  2.  History of alcohol use during the pregnancy.  Last use was in 2023.  3.  A2 diabetes.  4.  Iron deficiency anemia.     PAST SURGICAL HISTORY:  None.     MEDICATIONS:  She is on olanzapine 15 mg 1 p.o. q. day.  She is on sertraline   150 mg 1 p.o. q. day.  She takes a prenatal vitamin once a day.  She is on   aspirin 81 mg 1 p.o. q. day.  She is on ferrous sulfate 325 mg 1 p.o. b.i.d.   and she takes Humalog insulin 8 units before breakfast and 8 units before   dinner.     ALLERGIES:  No known drug allergies.     OBSTETRICAL HISTORY:  She is a  7, para 4.  First pregnancy, 1997   at 40 weeks, 7 pound male infant, normal spontaneous vaginal delivery.  On   2000 at 39 weeks, 6 pound 7 ounce female, normal spontaneous vaginal   delivery.  She had an SAB in the first trimester in  at 5 weeks.  On   2004 at 39 weeks, she had a 7-pound male infant, normal spontaneous   vaginal delivery.  In  at 5 weeks, she had a spontaneous .  On   2009 at 40 weeks, she had a 7-pound male, normal spontaneous vaginal   delivery.     GYNECOLOGIC HISTORY:  The patient started menstruating at age 12, has   menstrual cycles every 28 days, lasts 5 days.  Her last menstrual cycle was on   2022.  Her last Pap smear 2023, ASCUS Pap, positive HPV, but   negative HPV 16 and 18, negative chlamydia, negative gonorrhea.     SOCIAL HISTORY:  The patient is , but she has a new partner.  Denies   tobacco use.  Denies drug use, but admits to alcohol use.  She has had   sporadic alcohol use and the last use during pregnancy was in April.     FAMILY HISTORY:  Mother with diabetes.     PHYSICAL EXAMINATION:    VITAL SIGNS:  Blood pressure 121/69.  Total  weight gain 19 pounds.  GENERAL:  Pleasant female in no acute distress.  LUNGS:  Clear to auscultation bilaterally.  CARDIOVASCULAR:  Regular rate and rhythm.  No murmur.  ABDOMEN:  Soft, gravid.  Leopold's to 7 pounds.  EXTREMITIES:  No calf tenderness.  PELVIC:  Fetal heart tones 140s, category 1.  Soldier none.  Sterile vaginal   exam, fingertip, thick, and high.  Limited ultrasound, vertex presentation,   EDDIE 13, posterior placenta.     LABORATORY DATA:  Group B strep is negative.  One-hour glucose 200, H and H at   28 weeks 11.3 and 34.3, platelets 199.  RPR nonreactive.  MSAFP is negative.    NIPT test negative for chromosomes 13, 18 and 21, male.  TSH normal at 1.4.    Hepatitis B surface antigen negative, hepatitis C negative, RPR nonreactive,   rubella immune, O positive, antibody screen negative, HIV nonreactive.  CMP in   2023, negative AST, negative ALT at 16 and 13.     DIAGNOSTIC DATA:  Most current ultrasound by [facility] on   2023, fetus measured 2725 grams, which is 6 pounds; 50th percentile;   breech presentation at that time; fetal growth appeared normal; cardiac   activity 156 beats per minute; posterior placenta; no placenta previa.     ASSESSMENT AND PLAN:  A 43-year-old  7, para 4 at 38+ weeks with a due   date of 2023.  1.  A2 diabetes/advanced maternal age.  The patient will be scheduled for   induction of labor on Thursday.  We will start with cervical ripening with   Cytotec and then proceed with Pitocin as needed.  I have discussed with the   patient the reason for induction of labor and all questions answered.  2.  A2 diabetes, on insulin.  The patient with adequate fingerstick blood   sugars and fair control.  3.  Advanced maternal age.  The patient had a negative NIPT and a negative   MSAFP.  The patient has also had normal comprehensive ultrasounds with [facility].  4.  Vertex presentation documented by my ultrasound on  07/31/2023.  5.  History of alcohol use during pregnancy.  The patient states that she has   not had any alcohol use since April.  6.  Depression.  The patient is being treated by the psychiatrist at [facility].  The patient was on carbamazepine, olanzapine and sertraline in the   beginning of the pregnancy.  In the beginning, she discontinued all 3   medications, but then was restarted on olanzapine and sertraline.  She did not   restart carbamazepine.  All her ultrasounds with [facility]   are normal.  The patient had one episode of severe depression in March, but   she has been working with her psychiatrist and is seeing him once a month.  At   this time, the patient has been stable with depression since April.  7.  Group B strep negative.        ______________________________  FAIZAN KUHN MD    [facility]    DD:  08/01/2023 21:18  DT:  08/01/2023 22:18    Job#:  [id number]    CC:[facility]

## 2023-08-03 ENCOUNTER — HOSPITAL ENCOUNTER (INPATIENT)
Facility: MEDICAL CENTER | Age: 43
LOS: 1 days | End: 2023-08-04
Attending: OBSTETRICS & GYNECOLOGY | Admitting: OBSTETRICS & GYNECOLOGY
Payer: COMMERCIAL

## 2023-08-03 ENCOUNTER — APPOINTMENT (OUTPATIENT)
Dept: OBGYN | Facility: MEDICAL CENTER | Age: 43
End: 2023-08-03
Attending: OBSTETRICS & GYNECOLOGY
Payer: COMMERCIAL

## 2023-08-03 LAB
ALBUMIN SERPL BCP-MCNC: 3.3 G/DL (ref 3.2–4.9)
ALBUMIN/GLOB SERPL: 1.1 G/DL
ALP SERPL-CCNC: 136 U/L (ref 30–99)
ALT SERPL-CCNC: 9 U/L (ref 2–50)
ANION GAP SERPL CALC-SCNC: 12 MMOL/L (ref 7–16)
AST SERPL-CCNC: 15 U/L (ref 12–45)
BASOPHILS # BLD AUTO: 0.4 % (ref 0–1.8)
BASOPHILS # BLD: 0.03 K/UL (ref 0–0.12)
BILIRUB SERPL-MCNC: <0.2 MG/DL (ref 0.1–1.5)
BUN SERPL-MCNC: 13 MG/DL (ref 8–22)
CALCIUM ALBUM COR SERPL-MCNC: 9.9 MG/DL (ref 8.5–10.5)
CALCIUM SERPL-MCNC: 9.3 MG/DL (ref 8.5–10.5)
CHLORIDE SERPL-SCNC: 106 MMOL/L (ref 96–112)
CO2 SERPL-SCNC: 16 MMOL/L (ref 20–33)
CREAT SERPL-MCNC: 0.69 MG/DL (ref 0.5–1.4)
EOSINOPHIL # BLD AUTO: 0.38 K/UL (ref 0–0.51)
EOSINOPHIL NFR BLD: 4.8 % (ref 0–6.9)
ERYTHROCYTE [DISTWIDTH] IN BLOOD BY AUTOMATED COUNT: 44.9 FL (ref 35.9–50)
GFR SERPLBLD CREATININE-BSD FMLA CKD-EPI: 109 ML/MIN/1.73 M 2
GLOBULIN SER CALC-MCNC: 3.1 G/DL (ref 1.9–3.5)
GLUCOSE BLD STRIP.AUTO-MCNC: 104 MG/DL (ref 65–99)
GLUCOSE BLD STRIP.AUTO-MCNC: 68 MG/DL (ref 65–99)
GLUCOSE SERPL-MCNC: 84 MG/DL (ref 65–99)
HCT VFR BLD AUTO: 35.4 % (ref 37–47)
HGB BLD-MCNC: 12.3 G/DL (ref 12–16)
HOLDING TUBE BB 8507: NORMAL
IMM GRANULOCYTES # BLD AUTO: 0.12 K/UL (ref 0–0.11)
IMM GRANULOCYTES NFR BLD AUTO: 1.5 % (ref 0–0.9)
LYMPHOCYTES # BLD AUTO: 1.73 K/UL (ref 1–4.8)
LYMPHOCYTES NFR BLD: 21.7 % (ref 22–41)
MCH RBC QN AUTO: 31.2 PG (ref 27–33)
MCHC RBC AUTO-ENTMCNC: 34.7 G/DL (ref 32.2–35.5)
MCV RBC AUTO: 89.8 FL (ref 81.4–97.8)
MONOCYTES # BLD AUTO: 0.81 K/UL (ref 0–0.85)
MONOCYTES NFR BLD AUTO: 10.1 % (ref 0–13.4)
NEUTROPHILS # BLD AUTO: 4.92 K/UL (ref 1.82–7.42)
NEUTROPHILS NFR BLD: 61.5 % (ref 44–72)
NRBC # BLD AUTO: 0 K/UL
NRBC BLD-RTO: 0 /100 WBC (ref 0–0.2)
PLATELET # BLD AUTO: 154 K/UL (ref 164–446)
PMV BLD AUTO: 11.6 FL (ref 9–12.9)
POTASSIUM SERPL-SCNC: 3.8 MMOL/L (ref 3.6–5.5)
PROT SERPL-MCNC: 6.4 G/DL (ref 6–8.2)
RBC # BLD AUTO: 3.94 M/UL (ref 4.2–5.4)
SODIUM SERPL-SCNC: 134 MMOL/L (ref 135–145)
T PALLIDUM AB SER QL IA: NORMAL
WBC # BLD AUTO: 8 K/UL (ref 4.8–10.8)

## 2023-08-03 PROCEDURE — 700105 HCHG RX REV CODE 258: Performed by: OBSTETRICS & GYNECOLOGY

## 2023-08-03 PROCEDURE — A9270 NON-COVERED ITEM OR SERVICE: HCPCS | Performed by: OBSTETRICS & GYNECOLOGY

## 2023-08-03 PROCEDURE — 3E0P7VZ INTRODUCTION OF HORMONE INTO FEMALE REPRODUCTIVE, VIA NATURAL OR ARTIFICIAL OPENING: ICD-10-PCS | Performed by: OBSTETRICS & GYNECOLOGY

## 2023-08-03 PROCEDURE — 80053 COMPREHEN METABOLIC PANEL: CPT

## 2023-08-03 PROCEDURE — 36415 COLL VENOUS BLD VENIPUNCTURE: CPT

## 2023-08-03 PROCEDURE — 85025 COMPLETE CBC W/AUTO DIFF WBC: CPT

## 2023-08-03 PROCEDURE — 82962 GLUCOSE BLOOD TEST: CPT | Mod: 91

## 2023-08-03 PROCEDURE — 304965 HCHG RECOVERY SERVICES

## 2023-08-03 PROCEDURE — 770002 HCHG ROOM/CARE - OB PRIVATE (112)

## 2023-08-03 PROCEDURE — 700111 HCHG RX REV CODE 636 W/ 250 OVERRIDE (IP): Mod: JZ | Performed by: OBSTETRICS & GYNECOLOGY

## 2023-08-03 PROCEDURE — 700101 HCHG RX REV CODE 250: Performed by: OBSTETRICS & GYNECOLOGY

## 2023-08-03 PROCEDURE — 0HQ9XZZ REPAIR PERINEUM SKIN, EXTERNAL APPROACH: ICD-10-PCS | Performed by: OBSTETRICS & GYNECOLOGY

## 2023-08-03 PROCEDURE — 10907ZC DRAINAGE OF AMNIOTIC FLUID, THERAPEUTIC FROM PRODUCTS OF CONCEPTION, VIA NATURAL OR ARTIFICIAL OPENING: ICD-10-PCS | Performed by: OBSTETRICS & GYNECOLOGY

## 2023-08-03 PROCEDURE — 10H07YZ INSERTION OF OTHER DEVICE INTO PRODUCTS OF CONCEPTION, VIA NATURAL OR ARTIFICIAL OPENING: ICD-10-PCS | Performed by: OBSTETRICS & GYNECOLOGY

## 2023-08-03 PROCEDURE — 59409 OBSTETRICAL CARE: CPT

## 2023-08-03 PROCEDURE — 700111 HCHG RX REV CODE 636 W/ 250 OVERRIDE (IP): Mod: JZ

## 2023-08-03 PROCEDURE — 700102 HCHG RX REV CODE 250 W/ 637 OVERRIDE(OP): Performed by: OBSTETRICS & GYNECOLOGY

## 2023-08-03 PROCEDURE — 86780 TREPONEMA PALLIDUM: CPT

## 2023-08-03 PROCEDURE — 3E033VJ INTRODUCTION OF OTHER HORMONE INTO PERIPHERAL VEIN, PERCUTANEOUS APPROACH: ICD-10-PCS | Performed by: OBSTETRICS & GYNECOLOGY

## 2023-08-03 RX ORDER — DOCUSATE SODIUM 100 MG/1
100 CAPSULE, LIQUID FILLED ORAL 2 TIMES DAILY PRN
Status: DISCONTINUED | OUTPATIENT
Start: 2023-08-03 | End: 2023-08-05 | Stop reason: HOSPADM

## 2023-08-03 RX ORDER — IBUPROFEN 800 MG/1
800 TABLET ORAL
Status: COMPLETED | OUTPATIENT
Start: 2023-08-03 | End: 2023-08-03

## 2023-08-03 RX ORDER — TERBUTALINE SULFATE 1 MG/ML
0.25 INJECTION, SOLUTION SUBCUTANEOUS
Status: DISCONTINUED | OUTPATIENT
Start: 2023-08-03 | End: 2023-08-03 | Stop reason: HOSPADM

## 2023-08-03 RX ORDER — LIDOCAINE HYDROCHLORIDE 10 MG/ML
20 INJECTION, SOLUTION INFILTRATION; PERINEURAL
Status: COMPLETED | OUTPATIENT
Start: 2023-08-03 | End: 2023-08-03

## 2023-08-03 RX ORDER — OXYCODONE HYDROCHLORIDE 5 MG/1
5 TABLET ORAL EVERY 4 HOURS PRN
Status: DISCONTINUED | OUTPATIENT
Start: 2023-08-03 | End: 2023-08-05 | Stop reason: HOSPADM

## 2023-08-03 RX ORDER — SODIUM CHLORIDE, SODIUM LACTATE, POTASSIUM CHLORIDE, CALCIUM CHLORIDE 600; 310; 30; 20 MG/100ML; MG/100ML; MG/100ML; MG/100ML
INJECTION, SOLUTION INTRAVENOUS PRN
Status: DISCONTINUED | OUTPATIENT
Start: 2023-08-03 | End: 2023-08-05 | Stop reason: HOSPADM

## 2023-08-03 RX ORDER — OXYTOCIN 10 [USP'U]/ML
10 INJECTION, SOLUTION INTRAMUSCULAR; INTRAVENOUS
Status: DISCONTINUED | OUTPATIENT
Start: 2023-08-03 | End: 2023-08-03 | Stop reason: HOSPADM

## 2023-08-03 RX ORDER — OLANZAPINE 5 MG/1
15 TABLET ORAL 2 TIMES DAILY
Status: DISCONTINUED | OUTPATIENT
Start: 2023-08-04 | End: 2023-08-04

## 2023-08-03 RX ORDER — ONDANSETRON 4 MG/1
4 TABLET, ORALLY DISINTEGRATING ORAL EVERY 6 HOURS PRN
Status: DISCONTINUED | OUTPATIENT
Start: 2023-08-03 | End: 2023-08-03 | Stop reason: HOSPADM

## 2023-08-03 RX ORDER — MISOPROSTOL 200 UG/1
600 TABLET ORAL
Status: DISCONTINUED | OUTPATIENT
Start: 2023-08-03 | End: 2023-08-05 | Stop reason: HOSPADM

## 2023-08-03 RX ORDER — METHYLERGONOVINE MALEATE 0.2 MG/ML
0.2 INJECTION INTRAVENOUS ONCE
Status: COMPLETED | OUTPATIENT
Start: 2023-08-03 | End: 2023-08-03

## 2023-08-03 RX ORDER — IBUPROFEN 800 MG/1
800 TABLET ORAL EVERY 8 HOURS PRN
Status: DISCONTINUED | OUTPATIENT
Start: 2023-08-03 | End: 2023-08-05 | Stop reason: HOSPADM

## 2023-08-03 RX ORDER — ACETAMINOPHEN 500 MG
1000 TABLET ORAL
Status: DISCONTINUED | OUTPATIENT
Start: 2023-08-03 | End: 2023-08-03 | Stop reason: HOSPADM

## 2023-08-03 RX ORDER — ONDANSETRON 2 MG/ML
4 INJECTION INTRAMUSCULAR; INTRAVENOUS EVERY 6 HOURS PRN
Status: DISCONTINUED | OUTPATIENT
Start: 2023-08-03 | End: 2023-08-03 | Stop reason: HOSPADM

## 2023-08-03 RX ORDER — METHYLERGONOVINE MALEATE 0.2 MG/ML
INJECTION INTRAVENOUS
Status: COMPLETED
Start: 2023-08-03 | End: 2023-08-03

## 2023-08-03 RX ORDER — VITAMIN A ACETATE, BETA CAROTENE, ASCORBIC ACID, CHOLECALCIFEROL, .ALPHA.-TOCOPHEROL ACETATE, DL-, THIAMINE MONONITRATE, RIBOFLAVIN, NIACINAMIDE, PYRIDOXINE HYDROCHLORIDE, FOLIC ACID, CYANOCOBALAMIN, CALCIUM CARBONATE, FERROUS FUMARATE, ZINC OXIDE, CUPRIC OXIDE 3080; 12; 120; 400; 1; 1.84; 3; 20; 22; 920; 25; 200; 27; 10; 2 [IU]/1; UG/1; MG/1; [IU]/1; MG/1; MG/1; MG/1; MG/1; MG/1; [IU]/1; MG/1; MG/1; MG/1; MG/1; MG/1
1 TABLET, FILM COATED ORAL
Status: DISCONTINUED | OUTPATIENT
Start: 2023-08-04 | End: 2023-08-05 | Stop reason: HOSPADM

## 2023-08-03 RX ORDER — ACETAMINOPHEN 500 MG
1000 TABLET ORAL EVERY 6 HOURS PRN
Status: DISCONTINUED | OUTPATIENT
Start: 2023-08-03 | End: 2023-08-05 | Stop reason: HOSPADM

## 2023-08-03 RX ORDER — MISOPROSTOL 200 UG/1
800 TABLET ORAL
Status: COMPLETED | OUTPATIENT
Start: 2023-08-03 | End: 2023-08-03

## 2023-08-03 RX ORDER — METHYLERGONOVINE MALEATE 0.2 MG/ML
0.2 INJECTION INTRAVENOUS
Status: DISCONTINUED | OUTPATIENT
Start: 2023-08-03 | End: 2023-08-05 | Stop reason: HOSPADM

## 2023-08-03 RX ORDER — SODIUM CHLORIDE, SODIUM LACTATE, POTASSIUM CHLORIDE, CALCIUM CHLORIDE 600; 310; 30; 20 MG/100ML; MG/100ML; MG/100ML; MG/100ML
INJECTION, SOLUTION INTRAVENOUS CONTINUOUS
Status: DISCONTINUED | OUTPATIENT
Start: 2023-08-03 | End: 2023-08-05 | Stop reason: HOSPADM

## 2023-08-03 RX ORDER — CARBOPROST TROMETHAMINE 250 UG/ML
250 INJECTION, SOLUTION INTRAMUSCULAR
Status: DISCONTINUED | OUTPATIENT
Start: 2023-08-03 | End: 2023-08-05 | Stop reason: HOSPADM

## 2023-08-03 RX ADMIN — FENTANYL CITRATE 100 MCG: 50 INJECTION, SOLUTION INTRAMUSCULAR; INTRAVENOUS at 18:16

## 2023-08-03 RX ADMIN — SODIUM CHLORIDE, POTASSIUM CHLORIDE, SODIUM LACTATE AND CALCIUM CHLORIDE: 600; 310; 30; 20 INJECTION, SOLUTION INTRAVENOUS at 15:15

## 2023-08-03 RX ADMIN — IBUPROFEN 800 MG: 800 TABLET, FILM COATED ORAL at 20:48

## 2023-08-03 RX ADMIN — MISOPROSTOL 800 MCG: 200 TABLET ORAL at 19:03

## 2023-08-03 RX ADMIN — METHYLERGONOVINE MALEATE 0.2 MG: 0.2 INJECTION INTRAVENOUS at 19:45

## 2023-08-03 RX ADMIN — CEFAZOLIN 2 G: 2 INJECTION, POWDER, FOR SOLUTION INTRAMUSCULAR; INTRAVENOUS at 21:23

## 2023-08-03 RX ADMIN — METHYLERGONOVINE MALEATE 0.2 MG: 0.2 INJECTION, SOLUTION INTRAMUSCULAR; INTRAVENOUS at 19:45

## 2023-08-03 RX ADMIN — OXYTOCIN 20 UNITS: 10 INJECTION, SOLUTION INTRAMUSCULAR; INTRAVENOUS at 18:47

## 2023-08-03 RX ADMIN — OXYTOCIN 2 MILLI-UNITS/MIN: 10 INJECTION, SOLUTION INTRAMUSCULAR; INTRAVENOUS at 15:20

## 2023-08-03 RX ADMIN — OXYTOCIN 125 ML/HR: 10 INJECTION, SOLUTION INTRAMUSCULAR; INTRAVENOUS at 19:18

## 2023-08-03 RX ADMIN — LIDOCAINE HYDROCHLORIDE 20 ML: 10 INJECTION, SOLUTION INFILTRATION; PERINEURAL at 18:50

## 2023-08-03 ASSESSMENT — FIBROSIS 4 INDEX: FIB4 SCORE: 0.85

## 2023-08-03 ASSESSMENT — PATIENT HEALTH QUESTIONNAIRE - PHQ9
1. LITTLE INTEREST OR PLEASURE IN DOING THINGS: NOT AT ALL
SUM OF ALL RESPONSES TO PHQ9 QUESTIONS 1 AND 2: 0
2. FEELING DOWN, DEPRESSED, IRRITABLE, OR HOPELESS: NOT AT ALL

## 2023-08-03 ASSESSMENT — PAIN SCALES - GENERAL: PAINLEVEL: 0 - NO PAIN

## 2023-08-03 ASSESSMENT — LIFESTYLE VARIABLES: EVER_SMOKED: NEVER

## 2023-08-03 ASSESSMENT — PAIN DESCRIPTION - PAIN TYPE: TYPE: ACUTE PAIN

## 2023-08-03 NOTE — PROGRESS NOTES
"Labor Progress Note    Mohini Del Cid   38w3d/A2DM      Subjective:  Pt feeling few contractions but not painful.  Uterine contractions:yes  Pain: No    Objective:   Vitals:    08/03/23 1359   BP: 126/79   Pulse: 76   Resp: 16   Temp: 36.5 °C (97.7 °F)   TempSrc: Temporal   SpO2: 95%   Weight: 79.4 kg (175 lb)   Height: 1.626 m (5' 4\")     FHT: 150's category 1  Thebes: irregular  SVE:3+/75/-1, arom, thick mec    Membranes ruptured: .yes, thick mec    Meds:   Epidural : .no  Pitocin: .yes, 2 mu    Labs:  Recent Results (from the past 24 hour(s))   Hold Blood Bank Specimen (Not Tested)    Collection Time: 08/03/23  2:19 PM   Result Value Ref Range    Holding Tube - Bb DONE    CBC with differential    Collection Time: 08/03/23  2:19 PM   Result Value Ref Range    WBC 8.0 4.8 - 10.8 K/uL    RBC 3.94 (L) 4.20 - 5.40 M/uL    Hemoglobin 12.3 12.0 - 16.0 g/dL    Hematocrit 35.4 (L) 37.0 - 47.0 %    MCV 89.8 81.4 - 97.8 fL    MCH 31.2 27.0 - 33.0 pg    MCHC 34.7 32.2 - 35.5 g/dL    RDW 44.9 35.9 - 50.0 fL    Platelet Count 154 (L) 164 - 446 K/uL    MPV 11.6 9.0 - 12.9 fL    Neutrophils-Polys 61.50 44.00 - 72.00 %    Lymphocytes 21.70 (L) 22.00 - 41.00 %    Monocytes 10.10 0.00 - 13.40 %    Eosinophils 4.80 0.00 - 6.90 %    Basophils 0.40 0.00 - 1.80 %    Immature Granulocytes 1.50 (H) 0.00 - 0.90 %    Nucleated RBC 0.00 0.00 - 0.20 /100 WBC    Neutrophils (Absolute) 4.92 1.82 - 7.42 K/uL    Lymphs (Absolute) 1.73 1.00 - 4.80 K/uL    Monos (Absolute) 0.81 0.00 - 0.85 K/uL    Eos (Absolute) 0.38 0.00 - 0.51 K/uL    Baso (Absolute) 0.03 0.00 - 0.12 K/uL    Immature Granulocytes (abs) 0.12 (H) 0.00 - 0.11 K/uL    NRBC (Absolute) 0.00 K/uL   Comp Metabolic Panel    Collection Time: 08/03/23  2:41 PM   Result Value Ref Range    Sodium 134 (L) 135 - 145 mmol/L    Potassium 3.8 3.6 - 5.5 mmol/L    Chloride 106 96 - 112 mmol/L    Co2 16 (L) 20 - 33 mmol/L    Anion Gap 12.0 7.0 - 16.0    Glucose 84 65 - 99 mg/dL    Bun 13 " 8 - 22 mg/dL    Creatinine 0.69 0.50 - 1.40 mg/dL    Calcium 9.3 8.5 - 10.5 mg/dL    Correct Calcium 9.9 8.5 - 10.5 mg/dL    AST(SGOT) 15 12 - 45 U/L    ALT(SGPT) 9 2 - 50 U/L    Alkaline Phosphatase 136 (H) 30 - 99 U/L    Total Bilirubin <0.2 0.1 - 1.5 mg/dL    Albumin 3.3 3.2 - 4.9 g/dL    Total Protein 6.4 6.0 - 8.2 g/dL    Globulin 3.1 1.9 - 3.5 g/dL    A-G Ratio 1.1 g/dL   ESTIMATED GFR    Collection Time: 23  2:41 PM   Result Value Ref Range    GFR (CKD-EPI) 109 >60 mL/min/1.73 m 2       Assessment:   38w3d/A2DM- pt on Pitocin. CPM. Exp .  FSBS q 2 hours. FSBS 84.  2. GBBS negative  2.  Advanced maternal age, negative NIPT, negative MSAFP.  3.  Depression.  4.  Iron deficiency anemia.  5.  History of alcohol use.  6. Fetal status-reassuring      Kinjal Gavin M.D.

## 2023-08-03 NOTE — PROGRESS NOTES
Patient presents to unit at 38w3d for IOL for GDM. Reports normal fetal movement, denies regular contractions, leakage of fluid, or vaginal bleeding. VS stable, blood sugar stable. SVE on admission 3-4cm/60/-2. Plan of care reviewed with Dr. Gavin. Blood sugars to be monitored q2 hr in labor.    1520 IOL started with pitocin.    1544 AROM thick mec fluid per Dr. Gavin. Amnioinfusion to be started, 500 mL bolus, then 100mL/hr.    1555 amnioinfusion started.    1620 blood sugar 68, patient asymptomatic. Dr. Gavin notified. Patient given juice, repeat blood sugar 104.    1800 patient c/o increased pain, denies rectal pressure. SVE 6/90/-1 at this time. IV fentanyl given with reported improvement in pain.    1830 RN called to bedside for increased pain and pressure. 9.5/100/0 at this time. Dr. Gavin called to bedside.    1839 SVE 10/100/+2. Patient pushed with good effort.    1846 spontaneous vaginal delivery of viable male infant. See delivery summary.  at delivery. Cytotec given at delivery.     1935 MD notified of patient's increased bleeding. See flowsheets. Orders received to administer IM methergine. See MAR.    2000 Dr. Gavin at bedside for evaluation, bimanual performs, clots extracted, see MD note. Pads weighed at 437 mL. Continue to monitor bleeding. Order received to give 2g IV ancef. See MAR.    2206 Patient transferred to postpartum in stable condition via wheelchair, report given to DANIA Perez.

## 2023-08-03 NOTE — CARE PLAN
The patient is Stable - Low risk of patient condition declining or worsening    Shift Goals  Clinical Goals: progress with cervical dilation  Patient Goals: manage pain with natural technique or IV pain meds if requested  Family Goals: FOB to cut cord    Progress made toward(s) clinical / shift goals:  AROM at 1544, pitocin initiated at 1520.    Patient is not progressing towards the following goals:      Problem: Risk for Infection and Impaired Wound Healing  Goal: Patient will remain free from infection  Outcome: Progressing     Problem: Pain  Goal: Patient's pain will be alleviated or reduced to the patient’s comfort goal  Outcome: Progressing

## 2023-08-03 NOTE — PROGRESS NOTES
Labor Progress Note    Mohini Del Cid   38w3d/A2DM      Subjective:  Pt here for IOL. Pt with rare contractions. Pt with good fetal mvt.  Uterine contractions:yes  Pain: No    Objective:   Vitals:    08/03/23 1359   BP: 126/79   Pulse: 76   Temp: 36.5 °C (97.7 °F)   TempSrc: Temporal     FHT: 140's category 1  Dunlap: rare      Membranes ruptured: .no    Meds:   Epidural : .no  Pitocin: .no    Labs:  No results found for this or any previous visit (from the past 24 hour(s)).    Assessment:   38w3d/IOL-pt getting admitted. Will start with cytotec for cervical ripening vs pitocin depending on menchaca score.  2.  Advanced maternal age, negative NIPT, negative MSAFP.  3.  A2 diabetes, on insulin-FSBS q 4 hours in latent labor and q 1 hour in active labor.  4.  Depression.  5.  Iron deficiency anemia.  6.  GBS negative.  7.  History of alcohol use.    Kinjal Gavin M.D.

## 2023-08-04 VITALS
TEMPERATURE: 98 F | HEIGHT: 64 IN | RESPIRATION RATE: 18 BRPM | DIASTOLIC BLOOD PRESSURE: 78 MMHG | WEIGHT: 175 LBS | BODY MASS INDEX: 29.88 KG/M2 | OXYGEN SATURATION: 97 % | SYSTOLIC BLOOD PRESSURE: 117 MMHG | HEART RATE: 70 BPM

## 2023-08-04 LAB
ERYTHROCYTE [DISTWIDTH] IN BLOOD BY AUTOMATED COUNT: 45.7 FL (ref 35.9–50)
HCT VFR BLD AUTO: 32.3 % (ref 37–47)
HGB BLD-MCNC: 10.7 G/DL (ref 12–16)
MCH RBC QN AUTO: 30.5 PG (ref 27–33)
MCHC RBC AUTO-ENTMCNC: 33.1 G/DL (ref 32.2–35.5)
MCV RBC AUTO: 92 FL (ref 81.4–97.8)
PLATELET # BLD AUTO: 146 K/UL (ref 164–446)
PMV BLD AUTO: 11.8 FL (ref 9–12.9)
RBC # BLD AUTO: 3.51 M/UL (ref 4.2–5.4)
WBC # BLD AUTO: 10.5 K/UL (ref 4.8–10.8)

## 2023-08-04 PROCEDURE — 85027 COMPLETE CBC AUTOMATED: CPT

## 2023-08-04 PROCEDURE — 36415 COLL VENOUS BLD VENIPUNCTURE: CPT

## 2023-08-04 PROCEDURE — A9270 NON-COVERED ITEM OR SERVICE: HCPCS | Performed by: OBSTETRICS & GYNECOLOGY

## 2023-08-04 PROCEDURE — 700102 HCHG RX REV CODE 250 W/ 637 OVERRIDE(OP): Performed by: OBSTETRICS & GYNECOLOGY

## 2023-08-04 RX ORDER — OLANZAPINE 5 MG/1
10 TABLET ORAL EVERY EVENING
Status: DISCONTINUED | OUTPATIENT
Start: 2023-08-04 | End: 2023-08-05 | Stop reason: HOSPADM

## 2023-08-04 RX ADMIN — OLANZAPINE 10 MG: 5 TABLET, FILM COATED ORAL at 03:03

## 2023-08-04 RX ADMIN — PRENATAL WITH FERROUS FUM AND FOLIC ACID 1 TABLET: 3080; 920; 120; 400; 22; 1.84; 3; 20; 10; 1; 12; 200; 27; 25; 2 TABLET ORAL at 09:10

## 2023-08-04 RX ADMIN — SERTRALINE 50 MG: 50 TABLET, FILM COATED ORAL at 09:10

## 2023-08-04 ASSESSMENT — EDINBURGH POSTNATAL DEPRESSION SCALE (EPDS)
I HAVE FELT SAD OR MISERABLE: NOT VERY OFTEN
I HAVE LOOKED FORWARD WITH ENJOYMENT TO THINGS: DEFINITELY LESS THAN I USED TO
I HAVE BLAMED MYSELF UNNECESSARILY WHEN THINGS WENT WRONG: YES, SOME OF THE TIME
THE THOUGHT OF HARMING MYSELF HAS OCCURRED TO ME: HARDLY EVER
I HAVE BEEN ANXIOUS OR WORRIED FOR NO GOOD REASON: YES, SOMETIMES
I HAVE BEEN ABLE TO LAUGH AND SEE THE FUNNY SIDE OF THINGS: NOT QUITE SO MUCH NOW
I HAVE BEEN SO UNHAPPY THAT I HAVE HAD DIFFICULTY SLEEPING: NOT VERY OFTEN
I HAVE BEEN SO UNHAPPY THAT I HAVE BEEN CRYING: ONLY OCCASIONALLY
I HAVE FELT SCARED OR PANICKY FOR NO GOOD REASON: YES, SOMETIMES
THINGS HAVE BEEN GETTING ON TOP OF ME: YES, SOMETIMES I HAVEN'T BEEN COPING AS WELL AS USUAL

## 2023-08-04 ASSESSMENT — PAIN DESCRIPTION - PAIN TYPE
TYPE: ACUTE PAIN

## 2023-08-04 NOTE — CARE PLAN
Problem: Knowledge Deficit - Postpartum  Goal: Patient will verbalize and demonstrate understanding of self and infant care  Outcome: Progressing     Problem: Altered Physiologic Condition  Goal: Patient physiologically stable as evidenced by normal lochia, palpable uterine involution and vitals within normal limits  Outcome: Progressing   The patient is Stable - Low risk of patient condition declining or worsening    Shift Goals  Clinical Goals: stable VS and lochia WNL  Patient Goals: rest  Family Goals: sleep    Progress made toward(s) clinical / shift goals:    Pt reports comfort after pain interventions, Fundus firm and lochia light, VSS, educated on POC, needs met at this time. Questions answered. Will continue to educate.

## 2023-08-04 NOTE — L&D DELIVERY NOTE
Delivery note  , male with apgars 6 and 7. RT at bedside. Cord gases clamped and sent. Placenta intact, 3 vc. 1st degree midline laceration repaired with local lidocaine and 2-0 vicryl ct-1 needle. Mom and baby doing well. Uterus firm with ebl: 300.

## 2023-08-04 NOTE — L&D DELIVERY NOTE
DATE OF SERVICE:  2023     ADMITTING DIAGNOSES:  1.  Intrauterine pregnancy at 38 weeks and 3 days.  2.  A2 diabetes, on insulin.  3.  Advanced maternal age, negative NIPT, negative MSAFP.  4.  Depression, on medications.  5.  History of iron deficiency anemia, resolved.  6.  Group B strep negative.  7.  History of alcohol use during pregnancy.  Last use in April.     PROCEDURES:  1.  Admission to labor and delivery.  2.  Pitocin up to 8 milliunits.  3.  Serial blood sugars.  4.  Normal spontaneous vaginal delivery of a vigorous male with Apgars 6 and 7   and postpartum is repair of first-degree midline laceration.     DELIVERY:  This patient is a 43-year-old  7, para 4    female, who was brought in at 38 weeks and 3 days for induction of labor   secondary to A2 diabetes, on insulin.  She has been co-managed by [facility].  She suffers from depression in the past, not during the   pregnancy, but in the past, she did have suicidal ideation that required   hospitalization.  She has been followed by [facility] for the   gestational diabetes and also by her psychiatrist at the [facility].  The   patient has been stable on sertraline and olanzapine.  When the patient was   brought in today for induction of labor, the fetal status was reassuring with   a category 1 strip.  She was luiz irregularly, but she was dilated to   3-4 cm dilated, 75% effaced, -1 station.  She had artificial rupture of   membranes at 3:43 p.m., it was thick particulate MAC.  An IUPC was placed and   amnioinfusion was started.  She declined an epidural and progressed on   Pitocin.  She progressed precipitously from 7 cm to complete and 45 minutes.    She was complete at 6:39 p.m. She was prepped and draped in the usual sterile   fashion.  Respiratory therapist was called to be at the bedside for the thick   particulate MAC.  Fetal status was reassuring with a category 1 strip.   At   6:46 p.m., I assisted with a normal spontaneous vaginal delivery of a vigorous   male in BRIAN position.  The head was delivered atraumatically and the rest of   the infant was delivered without any problems.  Mouth and nose were suctioned.    Infant placed on maternal abdomen.  Delayed cord clamping was performed for   30 seconds and then the cord was doubly clamped and cut and infant handed over   to the respiratory therapy team and L and D nurse team.  Cord gases were   clamped and sent.  The placenta was then delivered intact at 6:48 p.m.    Three-vessel cord was noted.  Uterus is firm and hemostatic with an EBL of   300.  She did have a first-degree midline laceration that was repaired with   local lidocaine and a 2-0 Vicryl on a CT1 needle.  The patient tolerated the   procedure well.  Mom and baby are doing well.  This was again a male with   Apgars 6 and 7 and gasses are pending.        ______________________________  MD SHAREE ADEN/MISA/MARIYA    DD:  08/03/2023 19:09  DT:  08/03/2023 20:53    Job#:  [id number]

## 2023-08-04 NOTE — DISCHARGE PLANNING
Discharge Planning Assessment Post Partum    Reason for Referral: Hx of anxiety, depression, bi polar and ETOH use  Address: [address] [place] [place], [place]  Type of Living Situation:Lives with her three older children  Mom Diagnosis: vaginal delivery   Baby Diagnosis:  38w3d  Primary Language: Palauan (spoke with pt in Cypriot)     Name of Baby: Nacho Sepulveda   Father of the Baby: Alexy Monet   Involved in baby’s care? Yes, FOB at bedside   Contact Information: [phone]    Prenatal Care: yes   Mom's PCP: [facility]  PCP for new baby: MOB plans on establishing with [facility]    Support System: MOB reports support through s/o and her two sisters  Coping/Bonding between mother & baby: Yes  Source of Feeding: breastfeeding and formula  Supplies for Infant: MOB reports being prepared for infant    Mom's Insurance: [place]   Baby Covered on Insurance:Yes  Mother Employed/School: MOB is currently unemployed   Other children in the home/names & ages: MOB has three children ages 23, 18 and 14    Financial Hardship/Income: none   Mom's Mental status: appropriate  Services used prior to admit: Medicaid, WIC    CPS History: none  Psychiatric History: MOB reports hx of anxiety and depression. Pt is currently on medications and reports feeling stable on them. Discussed PPD and encouraged MOB to reach out if feeling heightened anxiety or depression. MOB states she plans on making an appointment with her therapist through [facility] and also has an appt with her psychiatrist in one month.   Domestic Violence History: none  Drug/ETOH History: hx of ETOH, last use was in April    Resources Provided: PPD resource packet      Clearance for Discharge: Infant is cleared to d/c with MOB

## 2023-08-04 NOTE — PROGRESS NOTES
0715: Report received from Noc shift RN. Assumed pt care. Call light within reach, bed is locked and in the lowest position.      0910: Radha,  [id number] called for duration of first assessment and teaching. Assessment complete. Reinforced expected lochia color and amount and when to call RN. Pad saturated this assessment, with an additional 160 mL of blood after weighing the pad. Pt states she did not change her pad at all throughout the night, and can't remember when this current pad was placed. Fundus is firm no gushing or continued bleeding at this time.   Reinforced safe sleep, skin to skin, bundling in open crib, and feeding frequency and duration for infant. Reviewed plan of care, all questions answered at this time.      1045: Spoke with KRISTAN Perez about pt's depression screen. This RN let her know that pt has not completed it yet, and if it comes back outside of normal limits if we need to repeat the social work consult. Ana states we do not need to repeat as far as she is concerned if her depression screen comes back high.     1955: Phone call with Dr. Campos to update him that pt's depression screen is 15. This RN relayed that  has cleared, that pt has plan to see Dr. Gavin at 2 week and 6 weeks, is scheduled to see her psychiatrist, and is on a medication regime. Pt ok to discharge per Dr. Campos.

## 2023-08-04 NOTE — PROGRESS NOTES
"CTSP pt secondary to small amount of vaginal bleeding with fundal rub.    Uterus 2 cm above umbilicus, Bimanual exam: 400 ml of clotted blood evacuated. Uterus now firm and 2 cm below umbilicus. Methergine 0.20 mg IM given. IV pitocin in progress. Pt had cytotec 1,000 mcg after delivery. Continue to monitor.   Ancef 2 grams IV for prophylaxis.     .Blood Pressure: 121/79, Pulse: 79, Respiration: 16, Temperature: 36.7 °C (98.1 °F), Height: 162.6 cm (5' 4\"), Weight: 79.4 kg (175 lb), BMI (Calculated): 30.04, BSA (Calculated): 1.9, Pulse Oximetry: 95 %   "

## 2023-08-04 NOTE — DISCHARGE SUMMARY
Discharge Summary:        PPD #1 s/p   Mohini Del Cid    Admit Date:   8/3/2023  Discharge Date:  2023     Admitting diagnosis:  Indication for care in labor or delivery [O75.9]  Discharge Diagnosis: Status post vaginal, spontaneous.  Pregnancy Complications:     1.  Intrauterine pregnancy at 38 weeks and 3 days.  2.  A2 diabetes, on insulin.  3.  Advanced maternal age, negative NIPT, negative MSAFP.  4.  Depression, on medications.  5.  History of iron deficiency anemia, resolved.  6.  Group B strep negative.  7.  History of alcohol use during pregnancy.  Last use in April.        History:  Past Medical History:   Diagnosis Date    Psychiatric disorder     depression, bipolar, anxiety     OB History    Para Term  AB Living   5 5 1     5   SAB IAB Ectopic Molar Multiple Live Births           0 1      # Outcome Date GA Lbr Adam/2nd Weight Sex Delivery Anes PTL Lv   5 Term 23 38w3d 02:55 / 00:07 2.95 kg (6 lb 8.1 oz) M Vag-Spont None N DOUG   4 Para            3 Para            2 Para            1 Para                 Patient has no known allergies.  There are no problems to display for this patient.       Hospital Course:   43 y.o. , now para 5, was admitted with the above mentioned diagnosis, underwent Induction of Labor, vaginal, spontaneous. Patient postpartum course was unremarkable, with progressive advancement in diet , ambulation and toleration of oral analgesia. Patient without complaints today and desires discharge.      Vitals:    23 2105 23 2200 23 0200 23 0600   BP: 122/80 137/73 109/64 93/57   Pulse: 73 75 72 75   Resp: 16 18 18 18   Temp: 36.3 °C (97.4 °F) 36.6 °C (97.9 °F) 36.6 °C (97.8 °F) 36.6 °C (97.9 °F)   TempSrc: Temporal Temporal Temporal Temporal   SpO2:  96% 95% 94%   Weight:       Height:           Current Facility-Administered Medications   Medication Dose    OLANZapine (ZyPREXA) tablet 10 mg  10 mg    LR infusion       oxytocin (Pitocin) infusion (for post delivery)  125 mL/hr    oxytocin (Pitocin) infusion (for induction)  0.5-20 adarsh-units/min    lactated ringers infusion      docusate sodium (Colace) capsule 100 mg  100 mg    ibuprofen (Motrin) tablet 800 mg  800 mg    acetaminophen (Tylenol) tablet 1,000 mg  1,000 mg    tetanus-dipth-acell pertussis (Tdap) inj 0.5 mL  0.5 mL    PRN oxytocin (PITOCIN) (20 Units/1000 mL) PRN for excessive uterine bleeding - See Admin Instr  125-999 mL/hr    miSOPROStol (Cytotec) tablet 600 mcg  600 mcg    methylergonovine (Methergine) injection 0.2 mg  0.2 mg    carboPROST (Hemabate) injection 250 mcg  250 mcg    oxyCODONE immediate-release (Roxicodone) tablet 5 mg  5 mg    prenatal plus vitamin (Stuartnatal 1+1) 27-1 MG tablet 1 Tablet  1 Tablet    sertraline (Zoloft) tablet 50 mg  50 mg       Exam:  Breast Exam: negative  Abdomen: Abdomen soft, non-tender. BS normal. No masses,  No organomegaly  Fundus Non Tender: no  Extremity: extremities, peripheral pulses and reflexes normal     Labs:  Recent Labs     08/03/23  1419 08/04/23  0533   WBC 8.0 10.5   RBC 3.94* 3.51*   HEMOGLOBIN 12.3 10.7*   HEMATOCRIT 35.4* 32.3*   MCV 89.8 92.0   MCH 31.2 30.5   MCHC 34.7 33.1   RDW 44.9 45.7   PLATELETCT 154* 146*   MPV 11.6 11.8        Activity:   Discharge to home  Pelvic Rest x 6 weeks    Assessment/Plan:   1-PP-doing well, d/c home and f/u in 2 weeks.  2-A2DM- will need a 2 hr GTT at 6 weeks  3-Depression-pt has appt with Psychiatrist. Pt on Zoloft and Olanzapine.  Pt to f/u with me in 2 weeks  Follow up: 2 and 6 weeks with Dr Gavin.      Discharge Meds:   No current outpatient medications on file.   OTC ibuprofen/tylenol      Kinjal Gavin M.D.

## 2023-08-04 NOTE — LACTATION NOTE
This note was copied from a baby's chart.  MOB [place]ian speaking used ipad  Marlon #[id number]. MOB chooses to provide mixed feeds (breast & bottle), Baby 38.3 weeks, , MOB Hx GDM-insulin, Bipolar, Hx SI, alcohol use. MOB declines latch attempt @ this time, baby asleep. MOB reports baby not latching well, nipples large. LC assessed breasts nipples are quarter size- bilaterally, enc mother to call RN/LC with next latch for assessment. LC reviewed supplemental Guidelines 10-20-30 with MOB, mother voiced understanding.     MOB has Medicaid & [facility]. Encouraged mother to call WIC & see about a loaner pump for home to protect milk supply until baby is able to latch on breasts.     Breastfeeding plan:  Breastfeed/attempt on cue a minimum 8 or more times in 24 hours no longer than 3 hours from last feed. Offer formula according to guideline volumes after each breastfeed/attempt. Call WIC today for loaner pump to protect milk supply until baby is able to latch.

## 2023-08-05 NOTE — DISCHARGE INSTRUCTIONS
PATIENT DISCHARGE EDUCATION INSTRUCTION SHEET    REASONS TO CALL YOUR OBSTETRICIAN  Persistent fever, shaking, chills (Temperature higher than 100.4) may indicate you have an infection  Heavy bleeding: soaking more than 1 pad per hour; Passing clots an egg-sized clot or bigger may mean you have an postpartum hemorrhage  Foul odor from vagina or bad smelling or discolored discharge or blood  Breast infection (Mastitis symptoms); breast pain, chills, fever, redness or red streaks, may feel flu like symptoms  Urinary pain, burning or frequency  Incision that is not healing, increased redness, swelling, tenderness or pain, or any pus from episiotomy or  site may mean you have an infection  Redness, swelling, warmth, or painful to touch in the calf area of your leg may mean you have a blood clot  Severe or intensified depression, thoughts or feelings of wanting to hurt yourself or someone else   Pain in chest, obstructed breathing or shortness of breath (trouble catching your breath) may mean you are having a postpartum complication. Call your provider immediately   Headache that does not get better, even after taking medicine, a bad headache with vision changes or pain in the upper right area of your belly may mean you have high blood pressure or post birth preeclampsia. Call your provider immediately    HAND WASHING  All family and friends should wash their hands:  Before and after holding the baby  Before feeding the baby  After using the restroom or changing the baby's diaper    WOUND CARE  Ask your physician for additional care instructions. In general:   Incision:  May shower and pat incision dry   Keep the incision clean and dry  There should not be any opening or pus from the incision  Continue to walk at home 3 times a day   Do NOT lift anything heavier than your baby (over 10 pounds)  Encourage family to help participate in care of the  to allow rest and mom time to  heal  Episiotomy/Laceration  May use екатерина-spray bottle, witch hazel pads and dermaplast spray for comfort  Use екатерина-spray bottle after urinating to cleanse perineal area  To prevent burning during urination spray екатерина-water bottle on labial area   Pat perineal area dry until episiotomy/laceration is healed  Continue to use екатерина-bottle until bleeding stops as needed  If have a 2nd degree laceration or greater, a Sitz bath can offer relief from soreness, burning, and inflammation   Sitz Bath   Sit in 6 inches of warm water and soak laceration as needed until the laceration heals    VAGINAL CARE AND BLEEDING  Nothing inside vagina for 6 weeks:   No sexual intercourse, tampons or douching  Bleeding may continue for 2-4 weeks. Amount and color may vary  Soaking 1 pad or more in an hour for several hours is considered heavy bleeding  Passing large egg sized blood clots can be concerning  If you feel like you have heavy bleeding or are having increasing amount of blood clots call your Obstetrician immediately  If you begin feeling faint upon standing, feeling sick to your stomach, have clammy skin, a really fast heartbeat, have chills, start feeling confused, dizzy, sleepy or weak, or feeling like you're going to faint call your Obstetrician immediately    HYPERTENSION   Preeclampsia or gestational hypertension are types of high blood pressure that only pregnant women can get. It is important for you to be aware of symptoms to seek early intervention and treatment. If you have any of these symptoms immediately call your Obstetrician    Vision changes or blurred vision   Severe headache or pain that is unrelieved with medication and will not go away  Persistent pain in upper abdomen or shoulder   Increased swelling of face, feet, or hands  Difficulty breathing or shortness of breath at rest  Urinating less than usual    URINATION AND BOWEL MOVEMENTS  Eating more fiber (bran cereal, fruits, and vegetables) and drinking  "plenty of fluids will help to avoid constipation  Urinary frequency and urgency after childbirth is normal  If you experience any urinary pain, burning or frequency call your provider    BIRTH CONTROL  It is possible to become pregnant at any time after delivery and while breastfeeding  Plan to discuss a method of birth control with your physician at your post delivery follow up visit    POSTPARTUM BLUES  During the first few days after birth, you may experience a sense of the \"blues\" which may include impatience, irritability or even crying. These feelings come and go quickly. However, as many as 1 in 10 women experience emotional symptoms known as postpartum depression.     POSTPARTUM DEPRESSION    May start as early as the second or third day after delivery or take several weeks or months to develop. Symptoms of \"blues\" are present, but are more intense: Crying spells; loss of appetite; feelings of hopelessness or loss of control; fear of touching the baby; over concern or no concern at all about the baby; little or no concern about your own appearance/caring for yourself; and/or inability to sleep or excessive sleeping. Contact your Obstetrician if you are experiencing any of these symptoms     PREVENTING SHAKEN BABY  If you are angry or stressed, PUT THE BABY IN THE CRIB, step away, take some deep breaths, and wait until you are calm to care for the baby. DO NOT SHAKE THE BABY. You are not alone, call a supporter for help.  [facility]  crisis call line (171-097-5278) or (1-535.138.6363)  You can also text them, text \"ANSWER\" (134742) PATIENT DISCHARGE EDUCATION INSTRUCTION SHEET    REASONS TO CALL YOUR PEDIATRICIAN  Projectile or forceful vomiting for more than one feeding  Unusual rash lasting more than 24 hours  Very sleepy, difficult to wake up  Bright yellow or pumpkin colored skin with extreme sleepiness  Temperature below 97.6 or above 100.4 F rectally  Feeding problems  Breathing " problems  Excessive crying with no known cause  If cord starts to become red, swollen, develops a smell or discharge  No wet diaper or stool in a 24 hour time period     SAFE SLEEP POSITIONING FOR YOUR BABY  The American Academy [employer] Pediatrics advises your baby should be placed on his/her back for  Sleeping to reduce the risk of Sudden Infant Death Syndrome (SIDS)  Baby should sleep by themselves in a crib, portable crib or bassinet  Baby should not share a bed with his/her parents  Baby should be placed on his or her back to sleep, night time and at naps  Baby should sleep on firm mattress with a tightly fitted sheet  NO couches, waterbeds or anything soft  Baby's sleep area should not contain any loose blankets, comforters, stuffed animals or any other soft items, (pillows, bumper pads, etc. ...)  Baby's face should be kept uncovered at all times  Baby should sleep in a smoke-free environment  Do not dress baby too warmly to prevent overheating    HAND WASHING  All family and friends should wash their hands:  Before and after holding the baby  Before feeding the baby  After using the restroom or changing the baby's diaper    TAKING BABY'S TEMPERATURE   If you feel your baby may have a fever take your baby's temperature per thermometer instructions  If taking axillary temperature place thermometer under baby's armpit and hold arm close to body  The most precise and accurate way to take a temperature is rectally  Turn on the digital thermometer and lubricate the tip of the thermometer with petroleum jelly.  Lay your baby or child on his or her back, lift his or her thighs, and insert the lubricated thermometer 1/2 to 1 inch (1.3 to 2.5 centimeters) into the rectum  Call your Pediatrician for temperature lower than 97.6 or greater than 100.4 F rectally    BATHE AND SHAMPOO BABY  Gently wash baby with a soft cloth using warm water and mild soap - rinse well  Do not put baby in tub bath until umbilical cord falls off  and appears well-healed  Bathing baby 2-3 times a week might be enough until your baby becomes more mobile. Bathing your baby too much can dry out his or her skin     NAIL CARE  First recommendation is to keep them covered to prevent facial scratching  During the first few weeks,  nails are very soft. Doctors recommend using only a fine emery board. Don't bite or tear your baby's nails. When your baby's nails are stronger, after a few weeks, you can switch to clippers or scissors making sure not to cut too short and nip the quick   A good time for nail care is while your baby is sleeping and moving less     CORD CARE  Fold diaper below umbilical cord until cord falls off  Keep umbilical cord clean and dry  May see a small amount of crust around the base of the cord. Clean off with mild soap and water and dry       DIAPER AND DRESS BABY  For baby girls: gently wipe from front to back. Mucous or pink tinged drainage is normal  For uncircumcised baby boys: do NOT pull back the foreskin to clean the penis. Gently clean with wipes or warm, soapy water  Dress baby in one more layer of clothing than you are wearing  Use a hat to protect from sun or cold. NO ties or drawstrings    URINATION AND BOWEL MOVEMENTS  If formula feeding or when breast milk feeding is established, your baby should wet 6-8 diapers a day and have at least 2 bowel movements a day during the first month  Bowel movements color and type can vary from day to day    CIRCUMCISION  If your child was circumcised watch out for the following:  Foul smelling discharge  Fever  Swelling   Crusty, fluid filled sores  Trouble urinating   Persistent bleeding or more than a quarter size spot of blood on his diaper  Yellow discharge lasting more than a week  Continue with care procedures until healed or have a visit with your Pediatrician     INFANT FEEDING  Most newborns feed 8-12 times, every 24 hours. YOU MAY NEED TO WAKE YOUR BABY UP TO FEED  If  breastfeeding, offer both breasts when your baby is showing feeding cues, such as rooting or bringing hand to mouth and sucking  Common for  babies to feed every 1-3 hours   Only allow baby to sleep up to 4 hours in between feeds if baby is feeding well at each feed. Offer breast anytime baby is showing feeding cues and at least every 3 hours  Follow up with outpatient [facility] for continued breast feeding support    FORMULA FEEDING  Feed baby formula every 2-3 hours when your baby is showing feeding cues  Paced bottle feeding will help baby not over eat at each feed     BOTTLE FEEDING   Paced Bottle Feeding is a method of bottle feeding that allows the infant to be more in control of the feeding pace. This feeding method slows down the flow of milk into the nipple and the mouth, allowing the baby to eat more slowly, and take breaks. Paced feeding reduces the risk of overfeeding that may result in discomfort for the baby   Hold baby almost upright or slightly reclined position supporting the head and neck  Use a small nipple for slow-flowing. Slow flow nipple holes help in controlling flow   Don't force the bottle's nipple into your baby's mouth. Tickle babies lip so baby opens their mouth  Insert nipple and hold the bottle flat  Let the baby suck three to four times without milk then tip the bottle just enough to fill the nipple about USP with milk  Let baby suck 3-5 continuous swallows, about 20-30 seconds tip the bottle down to give the baby a break  After a few seconds, when the baby begins to suck again, tip bottle up to allow milk to flow into the nipple  Continue to Pace feed until baby shows signs of fullness; no longer sucking after a break, turning away or pushing away the nipple   Bottle propping is not a recommended practice for feeding  Bottle propping is when you give a baby a bottle by leaning the bottle against a pillow, or other support, rather than holding the baby and the  "bottle.  Forces your baby to keep up with the flow, even if the baby is full   This can increase your baby's risk of choking, ear infections, and tooth decay    BOTTLE PREPARATION   Never feed  formula to your baby, or use formula if the container is dented  When using ready-to-feed, shake formula containers before opening  If formula is in a can, clean the lid of any dust, and be sure the can opener is clean  Formula does not need to be warmed. If you choose to feed warmed formula, do not microwave it. This can cause \"hot spots\" that could burn your baby. Instead, set the filled bottle in a bowl of warm (not boiling) water or hold the bottle under warm tap water. Sprinkle a few drops of formula on the inside of your wrist to make sure it's not too hot  Measure and pour desired amount of water into baby bottle  Add unpacked, level scoop(s) of powder to the bottle as directed on formula container. Return dry scoop to can  Put the cap on the bottle and shake. Move your wrist in a twisting motion helps powder formula mix more quickly and more thoroughly  Feed or store immediately in refrigerator  You need to sterilize bottles, nipples, rings, etc., only before the first use    CLEANING BOTTLE  Use hot, soapy water  Rinse the bottles and attachments separately and clean with a bottle brush  If your bottles are labelled  safe, you can alternatively go ahead and wash them in the    After washing, rinse the bottle parts thoroughly in hot running water to remove any bubbles or soap residue   Place the parts on a bottle drying rack   Make sure the bottles are left to drain in a well-ventilated location to ensure that they dry thoroughly    CAR SEAT  For your baby's safety and to comply with [place] State Law you will need to bring a car seat to the hospital before taking your baby home. Please read your car seat instructions before your baby's discharge from the hospital.  Make sure you place an " emergency contact sticker on your baby's car seat with your baby's identifying information  Car seat should not be placed in the front seat of a vehicle. The car seat should be placed in the back seat in the rear-facing position.  Car seat information is available through [employer] at [phone] and also at [email or web address]

## 2023-08-05 NOTE — PROGRESS NOTES
Report received from Karlee NAJERA. Pt has discharge orders to go home tonight. Assessment complete. Will review discharge paperwork with pt.

## 2023-08-05 NOTE — PROGRESS NOTES
Pt discharged home. Discharge instructions were given and reviewed. Pt had no questions at this time. ID bracelets matched to baby.